# Patient Record
Sex: MALE | Race: BLACK OR AFRICAN AMERICAN | Employment: OTHER | URBAN - METROPOLITAN AREA
[De-identification: names, ages, dates, MRNs, and addresses within clinical notes are randomized per-mention and may not be internally consistent; named-entity substitution may affect disease eponyms.]

---

## 2020-01-06 ENCOUNTER — OFFICE VISIT (OUTPATIENT)
Dept: FAMILY MEDICINE CLINIC | Age: 85
End: 2020-01-06
Payer: MEDICARE

## 2020-01-06 VITALS
SYSTOLIC BLOOD PRESSURE: 116 MMHG | WEIGHT: 239.8 LBS | DIASTOLIC BLOOD PRESSURE: 68 MMHG | HEART RATE: 58 BPM | TEMPERATURE: 96.3 F

## 2020-01-06 PROCEDURE — G8427 DOCREV CUR MEDS BY ELIG CLIN: HCPCS | Performed by: INTERNAL MEDICINE

## 2020-01-06 PROCEDURE — 4040F PNEUMOC VAC/ADMIN/RCVD: CPT | Performed by: INTERNAL MEDICINE

## 2020-01-06 PROCEDURE — 4004F PT TOBACCO SCREEN RCVD TLK: CPT | Performed by: INTERNAL MEDICINE

## 2020-01-06 PROCEDURE — 99213 OFFICE O/P EST LOW 20 MIN: CPT | Performed by: INTERNAL MEDICINE

## 2020-01-06 PROCEDURE — 1123F ACP DISCUSS/DSCN MKR DOCD: CPT | Performed by: INTERNAL MEDICINE

## 2020-01-06 PROCEDURE — G8421 BMI NOT CALCULATED: HCPCS | Performed by: INTERNAL MEDICINE

## 2020-01-06 PROCEDURE — G8484 FLU IMMUNIZE NO ADMIN: HCPCS | Performed by: INTERNAL MEDICINE

## 2020-01-06 RX ORDER — TEMAZEPAM 30 MG/1
CAPSULE ORAL
COMMUNITY
End: 2020-02-18 | Stop reason: SDUPTHER

## 2020-01-06 RX ORDER — CETIRIZINE HYDROCHLORIDE 10 MG/1
TABLET ORAL
COMMUNITY
End: 2021-05-27

## 2020-01-06 RX ORDER — ZOLPIDEM TARTRATE 10 MG/1
TABLET ORAL
COMMUNITY
End: 2020-03-10 | Stop reason: SDUPTHER

## 2020-01-06 RX ORDER — LEVOTHYROXINE SODIUM 0.12 MG/1
TABLET ORAL
Status: ON HOLD | COMMUNITY
End: 2020-04-29

## 2020-01-06 RX ORDER — MONTELUKAST SODIUM 10 MG/1
TABLET ORAL
COMMUNITY
End: 2020-11-12

## 2020-01-06 RX ORDER — DICLOFENAC SODIUM 75 MG/1
TABLET, DELAYED RELEASE ORAL
Status: ON HOLD | COMMUNITY
End: 2020-05-01 | Stop reason: HOSPADM

## 2020-01-06 RX ORDER — LATANOPROST 50 UG/ML
1 SOLUTION/ DROPS OPHTHALMIC
COMMUNITY
Start: 2018-09-13 | End: 2021-05-27

## 2020-01-06 RX ORDER — LEVOTHYROXINE SODIUM 112 UG/1
TABLET ORAL
COMMUNITY
End: 2020-02-19 | Stop reason: SDUPTHER

## 2020-01-06 RX ORDER — TAMSULOSIN HYDROCHLORIDE 0.4 MG/1
CAPSULE ORAL
Status: ON HOLD | COMMUNITY
End: 2020-05-01 | Stop reason: HOSPADM

## 2020-01-06 ASSESSMENT — ENCOUNTER SYMPTOMS
TROUBLE SWALLOWING: 0
FACIAL SWELLING: 0
SINUS PRESSURE: 0
EYE REDNESS: 0
CONSTIPATION: 0
EYE PAIN: 0
WHEEZING: 0
VOMITING: 0
ABDOMINAL DISTENTION: 0
ABDOMINAL PAIN: 0
BACK PAIN: 0
NAUSEA: 0
COLOR CHANGE: 0
BLOOD IN STOOL: 0
EYE ITCHING: 0
SHORTNESS OF BREATH: 0
RHINORRHEA: 0
PHOTOPHOBIA: 0
CHEST TIGHTNESS: 0
APNEA: 0
SINUS PAIN: 0
EYE DISCHARGE: 0
SORE THROAT: 0
RECTAL PAIN: 0
COUGH: 0
DIARRHEA: 0
VOICE CHANGE: 0

## 2020-01-06 NOTE — PROGRESS NOTES
Non-medical: Not on file   Tobacco Use    Smoking status: Not on file   Substance and Sexual Activity    Alcohol use: Not on file    Drug use: Not on file    Sexual activity: Not on file   Lifestyle    Physical activity:     Days per week: Not on file     Minutes per session: Not on file    Stress: Not on file   Relationships    Social connections:     Talks on phone: Not on file     Gets together: Not on file     Attends Religion service: Not on file     Active member of club or organization: Not on file     Attends meetings of clubs or organizations: Not on file     Relationship status: Not on file    Intimate partner violence:     Fear of current or ex partner: Not on file     Emotionally abused: Not on file     Physically abused: Not on file     Forced sexual activity: Not on file   Other Topics Concern    Not on file   Social History Narrative    Not on file        History reviewed. No pertinent family history. Vitals:    01/06/20 1616   BP: 116/68   Site: Right Upper Arm   Position: Sitting   Cuff Size: Large Adult   Pulse: 58   Temp: 96.3 °F (35.7 °C)   TempSrc: Temporal   Weight: 239 lb 12.8 oz (108.8 kg)     There is no height or weight on file to calculate BMI. Physical Exam  Constitutional:       General: He is not in acute distress. Appearance: He is well-developed. HENT:      Head: Normocephalic. Right Ear: External ear normal.      Left Ear: External ear normal.   Eyes:      Conjunctiva/sclera: Conjunctivae normal.   Neck:      Musculoskeletal: Neck supple. Vascular: No JVD. Trachea: No tracheal deviation. Cardiovascular:      Rate and Rhythm: Normal rate and regular rhythm. Heart sounds: Normal heart sounds. Pulmonary:      Effort: Pulmonary effort is normal. No respiratory distress. Breath sounds: Normal breath sounds. No wheezing or rales. Chest:      Chest wall: No tenderness.    Abdominal:      General: Bowel sounds are normal. There is no distension. Palpations: Abdomen is soft. There is no mass. Tenderness: There is no tenderness. There is no guarding. Musculoskeletal:         General: No tenderness or deformity. Lymphadenopathy:      Cervical: No cervical adenopathy. Skin:     General: Skin is warm and dry. Coloration: Skin is not pale. Findings: No erythema. Neurological:      Mental Status: He is alert and oriented to person, place, and time. Motor: No abnormal muscle tone. Psychiatric:         Thought Content: Thought content normal.         Judgment: Judgment normal.         ASSESSMENT/PLAN:  1. Hypothyroidism, unspecified type  - TSH with Reflex; Future        2. Health care maintenance  - Comprehensive Metabolic Panel; Future  - CBC Auto Differential; Future  - Lipid Panel; Future  - Vitamin D 25 Hydroxy; Future        3. Benign prostatic hyperplasia without lower urinary tract symptoms-continue Flomax. No follow-ups on file. An  electronic signature was used to authenticate this note.     --Panfilo Wade MD on 1/7/2020 at 5:47 PM

## 2020-02-17 ENCOUNTER — OFFICE VISIT (OUTPATIENT)
Dept: FAMILY MEDICINE CLINIC | Age: 85
End: 2020-02-17
Payer: MEDICARE

## 2020-02-17 VITALS
HEART RATE: 64 BPM | WEIGHT: 239.6 LBS | DIASTOLIC BLOOD PRESSURE: 68 MMHG | BODY MASS INDEX: 32.45 KG/M2 | TEMPERATURE: 97.6 F | OXYGEN SATURATION: 99 % | SYSTOLIC BLOOD PRESSURE: 128 MMHG | HEIGHT: 72 IN

## 2020-02-17 DIAGNOSIS — E03.9 HYPOTHYROIDISM, UNSPECIFIED TYPE: ICD-10-CM

## 2020-02-17 DIAGNOSIS — Z00.00 HEALTH CARE MAINTENANCE: ICD-10-CM

## 2020-02-17 LAB
ALBUMIN SERPL-MCNC: 3.9 G/DL (ref 3.5–4.6)
ALP BLD-CCNC: 57 U/L (ref 35–104)
ALT SERPL-CCNC: 13 U/L (ref 0–41)
ANION GAP SERPL CALCULATED.3IONS-SCNC: 13 MEQ/L (ref 9–15)
AST SERPL-CCNC: 28 U/L (ref 0–40)
BASOPHILS ABSOLUTE: 0 K/UL (ref 0–0.2)
BASOPHILS RELATIVE PERCENT: 0.8 %
BILIRUB SERPL-MCNC: 0.3 MG/DL (ref 0.2–0.7)
BUN BLDV-MCNC: 19 MG/DL (ref 8–23)
CALCIUM SERPL-MCNC: 9.3 MG/DL (ref 8.5–9.9)
CHLORIDE BLD-SCNC: 104 MEQ/L (ref 95–107)
CHOLESTEROL, TOTAL: 217 MG/DL (ref 0–199)
CO2: 26 MEQ/L (ref 20–31)
CREAT SERPL-MCNC: 2.06 MG/DL (ref 0.7–1.2)
EOSINOPHILS ABSOLUTE: 0 K/UL (ref 0–0.7)
EOSINOPHILS RELATIVE PERCENT: 0.6 %
GFR AFRICAN AMERICAN: 36.9
GFR NON-AFRICAN AMERICAN: 30.5
GLOBULIN: 3.6 G/DL (ref 2.3–3.5)
GLUCOSE BLD-MCNC: 119 MG/DL (ref 70–99)
HCT VFR BLD CALC: 38.2 % (ref 42–52)
HDLC SERPL-MCNC: 44 MG/DL (ref 40–59)
HEMOGLOBIN: 13 G/DL (ref 14–18)
LDL CHOLESTEROL CALCULATED: 132 MG/DL (ref 0–129)
LYMPHOCYTES ABSOLUTE: 1.4 K/UL (ref 1–4.8)
LYMPHOCYTES RELATIVE PERCENT: 32 %
MCH RBC QN AUTO: 33.9 PG (ref 27–31.3)
MCHC RBC AUTO-ENTMCNC: 33.9 % (ref 33–37)
MCV RBC AUTO: 99.9 FL (ref 80–100)
MONOCYTES ABSOLUTE: 0.3 K/UL (ref 0.2–0.8)
MONOCYTES RELATIVE PERCENT: 5.9 %
NEUTROPHILS ABSOLUTE: 2.7 K/UL (ref 1.4–6.5)
NEUTROPHILS RELATIVE PERCENT: 60.7 %
PDW BLD-RTO: 15.6 % (ref 11.5–14.5)
PLATELET # BLD: 190 K/UL (ref 130–400)
POTASSIUM SERPL-SCNC: 4.2 MEQ/L (ref 3.4–4.9)
RBC # BLD: 3.82 M/UL (ref 4.7–6.1)
SODIUM BLD-SCNC: 143 MEQ/L (ref 135–144)
T4 FREE: 0.26 NG/DL (ref 0.84–1.68)
TOTAL PROTEIN: 7.5 G/DL (ref 6.3–8)
TRIGL SERPL-MCNC: 204 MG/DL (ref 0–150)
TSH REFLEX: 133.8 UIU/ML (ref 0.44–3.86)
VITAMIN D 25-HYDROXY: 22.2 NG/ML (ref 30–100)
WBC # BLD: 4.5 K/UL (ref 4.8–10.8)

## 2020-02-17 PROCEDURE — 1123F ACP DISCUSS/DSCN MKR DOCD: CPT | Performed by: INTERNAL MEDICINE

## 2020-02-17 PROCEDURE — G8417 CALC BMI ABV UP PARAM F/U: HCPCS | Performed by: INTERNAL MEDICINE

## 2020-02-17 PROCEDURE — G8484 FLU IMMUNIZE NO ADMIN: HCPCS | Performed by: INTERNAL MEDICINE

## 2020-02-17 PROCEDURE — G8427 DOCREV CUR MEDS BY ELIG CLIN: HCPCS | Performed by: INTERNAL MEDICINE

## 2020-02-17 PROCEDURE — 99213 OFFICE O/P EST LOW 20 MIN: CPT | Performed by: INTERNAL MEDICINE

## 2020-02-17 PROCEDURE — 4004F PT TOBACCO SCREEN RCVD TLK: CPT | Performed by: INTERNAL MEDICINE

## 2020-02-17 PROCEDURE — 4040F PNEUMOC VAC/ADMIN/RCVD: CPT | Performed by: INTERNAL MEDICINE

## 2020-02-17 ASSESSMENT — PATIENT HEALTH QUESTIONNAIRE - PHQ9
1. LITTLE INTEREST OR PLEASURE IN DOING THINGS: 0
SUM OF ALL RESPONSES TO PHQ9 QUESTIONS 1 & 2: 0
2. FEELING DOWN, DEPRESSED OR HOPELESS: 0
SUM OF ALL RESPONSES TO PHQ QUESTIONS 1-9: 0
SUM OF ALL RESPONSES TO PHQ QUESTIONS 1-9: 0

## 2020-02-17 ASSESSMENT — ENCOUNTER SYMPTOMS
EYE PAIN: 0
VOMITING: 0
COLOR CHANGE: 0
EYE ITCHING: 0
BLOOD IN STOOL: 0
SINUS PAIN: 0
BACK PAIN: 0
SINUS PRESSURE: 0
COUGH: 0
ABDOMINAL PAIN: 0
RECTAL PAIN: 0
ABDOMINAL DISTENTION: 0
PHOTOPHOBIA: 0
SHORTNESS OF BREATH: 0
VOICE CHANGE: 0
APNEA: 0
EYE DISCHARGE: 0
CHEST TIGHTNESS: 0
RHINORRHEA: 0
FACIAL SWELLING: 0
CONSTIPATION: 0
DIARRHEA: 0
EYE REDNESS: 0
TROUBLE SWALLOWING: 0
WHEEZING: 0
SORE THROAT: 0
NAUSEA: 0

## 2020-02-17 NOTE — TELEPHONE ENCOUNTER
Angi Amanda is requesting medication refill. Patient has confirmed the pharmacy. Rx requested:  Requested Prescriptions     Pending Prescriptions Disp Refills    temazepam (RESTORIL) 30 MG capsule       Sig: Take by mouth.        Last Office Visit:   2/17/2020      Next Visit Date:  Future Appointments   Date Time Provider Megan Jasso   2/25/2020  9:45 AM Del Medellin  [place], [place]

## 2020-02-17 NOTE — PROGRESS NOTES
2020    Sirisha Javier (:  1930) is a 80 y.o. male, here for evaluation of the following medical concerns:    HPI  66-year-old male with a history of hypothyroidism presents to establish continuity with me as his primary care doctor. He voices no complaints at this time. Hypothyroidism:  He presently receives Synthroid 125 mcg daily. The patient is due for an assessment of his TSH and free T4. Benign prostatic hypertrophy :  The patient is compliant with Flomax. At present he denies polyuria,  Polydipsia, constitutional, sinus, visual, cardiopulmonary, urologic, gastrointestinal, immunologic/hematologic, musculoskeletal, neurologic,dermatologic, or psychiatric complaints. Review of Systems   Constitutional: Negative for chills, diaphoresis, fatigue and fever. HENT: Negative for congestion, dental problem, drooling, ear discharge, ear pain, facial swelling, hearing loss, mouth sores, nosebleeds, postnasal drip, rhinorrhea, sinus pressure, sinus pain, sneezing, sore throat, tinnitus, trouble swallowing and voice change. Eyes: Negative for photophobia, pain, discharge, redness, itching and visual disturbance. Respiratory: Negative for apnea, cough, chest tightness, shortness of breath and wheezing. Cardiovascular: Negative for chest pain, palpitations and leg swelling. Gastrointestinal: Negative for abdominal distention, abdominal pain, blood in stool, constipation, diarrhea, nausea, rectal pain and vomiting. Endocrine: Negative for cold intolerance, heat intolerance, polydipsia, polyphagia and polyuria. Genitourinary: Negative for decreased urine volume, difficulty urinating, dysuria, flank pain, frequency, genital sores, hematuria and urgency. Musculoskeletal: Negative for arthralgias, back pain, gait problem, joint swelling, myalgias, neck pain and neck stiffness. Skin: Negative for color change, rash and wound.    Allergic/Immunologic: Negative for Activity    Alcohol use: Not on file    Drug use: Not on file    Sexual activity: Not on file   Lifestyle    Physical activity:     Days per week: Not on file     Minutes per session: Not on file    Stress: Not on file   Relationships    Social connections:     Talks on phone: Not on file     Gets together: Not on file     Attends Yazidism service: Not on file     Active member of club or organization: Not on file     Attends meetings of clubs or organizations: Not on file     Relationship status: Not on file    Intimate partner violence:     Fear of current or ex partner: Not on file     Emotionally abused: Not on file     Physically abused: Not on file     Forced sexual activity: Not on file   Other Topics Concern    Not on file   Social History Narrative    Not on file        No family history on file. There were no vitals filed for this visit. There is no height or weight on file to calculate BMI. Physical Exam  Constitutional:       General: He is not in acute distress. Appearance: He is well-developed. HENT:      Head: Normocephalic. Right Ear: External ear normal.      Left Ear: External ear normal.   Eyes:      Conjunctiva/sclera: Conjunctivae normal.   Neck:      Musculoskeletal: Neck supple. Vascular: No JVD. Trachea: No tracheal deviation. Cardiovascular:      Rate and Rhythm: Normal rate and regular rhythm. Heart sounds: Normal heart sounds. Pulmonary:      Effort: Pulmonary effort is normal. No respiratory distress. Breath sounds: Normal breath sounds. No wheezing or rales. Chest:      Chest wall: No tenderness. Abdominal:      General: Bowel sounds are normal. There is no distension. Palpations: Abdomen is soft. There is no mass. Tenderness: There is no abdominal tenderness. There is no guarding. Musculoskeletal:         General: No tenderness or deformity. Lymphadenopathy:      Cervical: No cervical adenopathy.    Skin:     General:

## 2020-02-18 RX ORDER — TEMAZEPAM 30 MG/1
30 CAPSULE ORAL NIGHTLY PRN
Qty: 7 CAPSULE | Refills: 0 | Status: SHIPPED | OUTPATIENT
Start: 2020-02-18 | End: 2020-02-27 | Stop reason: SDUPTHER

## 2020-02-19 RX ORDER — LEVOTHYROXINE SODIUM 112 UG/1
112 TABLET ORAL DAILY
Qty: 90 TABLET | Refills: 1 | Status: SHIPPED | OUTPATIENT
Start: 2020-02-19 | End: 2020-11-12

## 2020-02-25 ENCOUNTER — OFFICE VISIT (OUTPATIENT)
Dept: FAMILY MEDICINE CLINIC | Age: 85
End: 2020-02-25
Payer: MEDICARE

## 2020-02-25 VITALS
TEMPERATURE: 96 F | DIASTOLIC BLOOD PRESSURE: 82 MMHG | SYSTOLIC BLOOD PRESSURE: 122 MMHG | WEIGHT: 240 LBS | HEART RATE: 64 BPM | BODY MASS INDEX: 32.55 KG/M2 | OXYGEN SATURATION: 97 %

## 2020-02-25 PROCEDURE — G8427 DOCREV CUR MEDS BY ELIG CLIN: HCPCS | Performed by: INTERNAL MEDICINE

## 2020-02-25 PROCEDURE — 99213 OFFICE O/P EST LOW 20 MIN: CPT | Performed by: INTERNAL MEDICINE

## 2020-02-25 PROCEDURE — 1123F ACP DISCUSS/DSCN MKR DOCD: CPT | Performed by: INTERNAL MEDICINE

## 2020-02-25 PROCEDURE — 4040F PNEUMOC VAC/ADMIN/RCVD: CPT | Performed by: INTERNAL MEDICINE

## 2020-02-25 PROCEDURE — G8417 CALC BMI ABV UP PARAM F/U: HCPCS | Performed by: INTERNAL MEDICINE

## 2020-02-25 PROCEDURE — G8484 FLU IMMUNIZE NO ADMIN: HCPCS | Performed by: INTERNAL MEDICINE

## 2020-02-25 PROCEDURE — 1036F TOBACCO NON-USER: CPT | Performed by: INTERNAL MEDICINE

## 2020-02-25 RX ORDER — TAMSULOSIN HYDROCHLORIDE 0.4 MG/1
0.8 CAPSULE ORAL DAILY
Qty: 30 CAPSULE | Refills: 0 | COMMUNITY
Start: 2020-02-25 | End: 2020-11-12

## 2020-02-25 RX ORDER — FINASTERIDE 5 MG/1
5 TABLET, FILM COATED ORAL DAILY
Qty: 30 TABLET | Refills: 3 | Status: SHIPPED | OUTPATIENT
Start: 2020-02-25 | End: 2020-11-12

## 2020-02-25 ASSESSMENT — ENCOUNTER SYMPTOMS
BACK PAIN: 0
COLOR CHANGE: 0
EYE REDNESS: 0
EYE ITCHING: 0
WHEEZING: 0
TROUBLE SWALLOWING: 0
RECTAL PAIN: 0
EYE DISCHARGE: 0
DIARRHEA: 0
SORE THROAT: 0
VOMITING: 0
BLOOD IN STOOL: 0
EYE PAIN: 0
COUGH: 0
APNEA: 0
RHINORRHEA: 0
SINUS PAIN: 0
NAUSEA: 0
ABDOMINAL PAIN: 0
VOICE CHANGE: 0
SINUS PRESSURE: 0
CONSTIPATION: 0
SHORTNESS OF BREATH: 0
ABDOMINAL DISTENTION: 0
PHOTOPHOBIA: 0
FACIAL SWELLING: 0
CHEST TIGHTNESS: 0

## 2020-02-25 NOTE — PROGRESS NOTES
abdominal distention, abdominal pain, blood in stool, constipation, diarrhea, nausea, rectal pain and vomiting. Endocrine: Negative for cold intolerance, heat intolerance, polydipsia, polyphagia and polyuria. Genitourinary: Negative for decreased urine volume, difficulty urinating, dysuria, flank pain, frequency, genital sores, hematuria and urgency. Musculoskeletal: Negative for arthralgias, back pain, gait problem, joint swelling, myalgias, neck pain and neck stiffness. Skin: Negative for color change, rash and wound. Allergic/Immunologic: Negative for environmental allergies and food allergies. Neurological: Positive for dizziness. Negative for tremors, seizures, syncope, facial asymmetry, speech difficulty, weakness, light-headedness, numbness and headaches. Hematological: Negative for adenopathy. Does not bruise/bleed easily. Psychiatric/Behavioral: Negative for agitation, confusion, decreased concentration, hallucinations, self-injury, sleep disturbance and suicidal ideas. The patient is not nervous/anxious. Prior to Visit Medications    Medication Sig Taking? Authorizing Provider   finasteride (PROSCAR) 5 MG tablet Take 1 tablet by mouth daily Yes Claire Cardozo MD   tamsulosin [facility]) 0.4 MG capsule Take 2 capsules by mouth daily Yes Claire Cardozo MD   levothyroxine (SYNTHROID) 112 MCG tablet Take 1 tablet by mouth Daily Yes Claire Cardozo MD   temazepam (RESTORIL) 30 MG capsule Take 1 capsule by mouth nightly as needed for Sleep for up to 7 days.  Yes Claire Cardozo MD   ASPIRIN 81 PO Take by mouth Yes Historical Provider, MD   levothyroxine (SYNTHROID) 125 MCG tablet  Yes Historical Provider, MD   tamsulosin (FLOMAX) 0.4 MG capsule Take by mouth Yes Historical Provider, MD   zolpidem (AMBIEN) 10 MG tablet  Yes Historical Provider, MD   montelukast (SINGULAIR) 10 MG tablet Take by mouth Yes Historical Provider, MD   diclofenac (VOLTAREN) 75 MG EC tablet  Yes Historical mass index is 32.55 kg/m² as calculated from the following:    Height as of 2/17/20: 6' (1.829 m). Weight as of this encounter: 240 lb (108.9 kg). Physical Exam  Constitutional:       General: He is not in acute distress. Appearance: He is well-developed. HENT:      Head: Normocephalic. Right Ear: External ear normal.      Left Ear: External ear normal.   Eyes:      Conjunctiva/sclera: Conjunctivae normal.   Neck:      Musculoskeletal: Neck supple. Vascular: No JVD. Trachea: No tracheal deviation. Cardiovascular:      Rate and Rhythm: Normal rate and regular rhythm. Heart sounds: Normal heart sounds. Pulmonary:      Effort: Pulmonary effort is normal. No respiratory distress. Breath sounds: Normal breath sounds. No wheezing or rales. Chest:      Chest wall: No tenderness. Abdominal:      General: Bowel sounds are normal. There is no distension. Palpations: Abdomen is soft. There is no mass. Tenderness: There is no abdominal tenderness. There is no guarding. Musculoskeletal:         General: No tenderness or deformity. Lymphadenopathy:      Cervical: No cervical adenopathy. Skin:     General: Skin is warm and dry. Coloration: Skin is not pale. Findings: No erythema. Neurological:      Mental Status: He is alert and oriented to person, place, and time. Motor: No abnormal muscle tone. Psychiatric:         Thought Content: Thought content normal.         Judgment: Judgment normal.         ASSESSMENT/PLAN:  1. Hypothyroidism, unspecified type  -Encouraged compliance with Synthroid SS thyroid level in 3 months. 2. Benign prostatic hyperplasia without lower urinary tract symptoms- increase Flomax 2.8 daily. Add finasteride. Return in about 2 weeks (around 3/10/2020). An  electronic signature was used to authenticate this note.     --Carl Alford MD on 2/25/2020 at 8:50 PM

## 2020-02-29 RX ORDER — TEMAZEPAM 30 MG/1
30 CAPSULE ORAL NIGHTLY PRN
Qty: 7 CAPSULE | Refills: 0 | Status: SHIPPED | OUTPATIENT
Start: 2020-02-29 | End: 2020-03-07

## 2020-03-02 ENCOUNTER — TELEPHONE (OUTPATIENT)
Dept: FAMILY MEDICINE CLINIC | Age: 85
End: 2020-03-02

## 2020-03-02 NOTE — TELEPHONE ENCOUNTER
Pt wife called because pt's Rx is showing printed in the system, but is not up front to be picked up. Is this Rx ready to be picked up? Please call pt/wife to advise.     TY

## 2020-03-10 ENCOUNTER — OFFICE VISIT (OUTPATIENT)
Dept: FAMILY MEDICINE CLINIC | Age: 85
End: 2020-03-10
Payer: MEDICARE

## 2020-03-10 VITALS
DIASTOLIC BLOOD PRESSURE: 72 MMHG | WEIGHT: 237.4 LBS | OXYGEN SATURATION: 96 % | TEMPERATURE: 96.3 F | HEART RATE: 50 BPM | SYSTOLIC BLOOD PRESSURE: 112 MMHG | BODY MASS INDEX: 32.2 KG/M2

## 2020-03-10 PROCEDURE — G8417 CALC BMI ABV UP PARAM F/U: HCPCS | Performed by: INTERNAL MEDICINE

## 2020-03-10 PROCEDURE — G8484 FLU IMMUNIZE NO ADMIN: HCPCS | Performed by: INTERNAL MEDICINE

## 2020-03-10 PROCEDURE — 1036F TOBACCO NON-USER: CPT | Performed by: INTERNAL MEDICINE

## 2020-03-10 PROCEDURE — 4040F PNEUMOC VAC/ADMIN/RCVD: CPT | Performed by: INTERNAL MEDICINE

## 2020-03-10 PROCEDURE — G8427 DOCREV CUR MEDS BY ELIG CLIN: HCPCS | Performed by: INTERNAL MEDICINE

## 2020-03-10 PROCEDURE — 1123F ACP DISCUSS/DSCN MKR DOCD: CPT | Performed by: INTERNAL MEDICINE

## 2020-03-10 PROCEDURE — 99213 OFFICE O/P EST LOW 20 MIN: CPT | Performed by: INTERNAL MEDICINE

## 2020-03-10 RX ORDER — ZOLPIDEM TARTRATE 10 MG/1
10 TABLET ORAL NIGHTLY PRN
Qty: 7 TABLET | Refills: 0 | Status: SHIPPED | OUTPATIENT
Start: 2020-03-10 | End: 2020-03-17

## 2020-03-10 ASSESSMENT — ENCOUNTER SYMPTOMS
SORE THROAT: 0
DIARRHEA: 0
VOICE CHANGE: 0
CONSTIPATION: 0
TROUBLE SWALLOWING: 0
EYE REDNESS: 0
EYE ITCHING: 0
ABDOMINAL DISTENTION: 0
EYE PAIN: 0
APNEA: 0
FACIAL SWELLING: 0
NAUSEA: 0
PHOTOPHOBIA: 0
SINUS PRESSURE: 0
VOMITING: 0
RHINORRHEA: 0
WHEEZING: 0
EYE DISCHARGE: 0
SINUS PAIN: 0
ABDOMINAL PAIN: 0
RECTAL PAIN: 0
CHEST TIGHTNESS: 0
BACK PAIN: 0
COLOR CHANGE: 0
COUGH: 0
SHORTNESS OF BREATH: 0
BLOOD IN STOOL: 0

## 2020-03-10 NOTE — PROGRESS NOTES
3/10/2020    Miriam Masters (:  1930) is a 80 y.o. male, here for evaluation of the following medical concerns:    Dizziness   Pertinent negatives include no abdominal pain, arthralgias, chest pain, chills, congestion, coughing, diaphoresis, fatigue, fever, headaches, joint swelling, myalgias, nausea, neck pain, numbness, rash, sore throat, vomiting or weakness. 80-year-old male with a history of hypothyroidism presents  for follow-up visit for his hypothyroidism. Hypothyroidism:  He presently receives Synthroid 125 mcg daily. The patient's TSH was markedly elevated on  and his thyroxine level low as is demonstrated below:    Component      Latest Ref Rng & Units 2020           4:34 PM  4:34 PM   TSH      0.440 - 3.860 uIU/mL  133.800 (H)   T4 Free      0.84 - 1.68 ng/dL 0.26 (L)    His abnormal labs are in the setting of increased fatigue. Benign prostatic hypertrophy :  The patient is compliant with Flomax and Proscar as prescribed. His symptoms of polyuria have improved however he is experiencing intermittent lightheadedness upon standing . At present he denies,  Polydipsia, constitutional, sinus, visual, cardiopulmonary, urologic, gastrointestinal, immunologic/hematologic, musculoskeletal, neurologic,dermatologic, or psychiatric complaints. Review of Systems   Constitutional: Negative for chills, diaphoresis, fatigue and fever. HENT: Negative for congestion, dental problem, drooling, ear discharge, ear pain, facial swelling, hearing loss, mouth sores, nosebleeds, postnasal drip, rhinorrhea, sinus pressure, sinus pain, sneezing, sore throat, tinnitus, trouble swallowing and voice change. Eyes: Negative for photophobia, pain, discharge, redness, itching and visual disturbance. Respiratory: Negative for apnea, cough, chest tightness, shortness of breath and wheezing. Cardiovascular: Negative for chest pain, palpitations and leg swelling. Gastrointestinal: Negative for abdominal distention, abdominal pain, blood in stool, constipation, diarrhea, nausea, rectal pain and vomiting. Endocrine: Negative for cold intolerance, heat intolerance, polydipsia, polyphagia and polyuria. Genitourinary: Negative for decreased urine volume, difficulty urinating, dysuria, flank pain, frequency, genital sores, hematuria and urgency. Musculoskeletal: Negative for arthralgias, back pain, gait problem, joint swelling, myalgias, neck pain and neck stiffness. Skin: Negative for color change, rash and wound. Allergic/Immunologic: Negative for environmental allergies and food allergies. Neurological: Positive for dizziness. Negative for tremors, seizures, syncope, facial asymmetry, speech difficulty, weakness, light-headedness, numbness and headaches. Hematological: Negative for adenopathy. Does not bruise/bleed easily. Psychiatric/Behavioral: Negative for agitation, confusion, decreased concentration, hallucinations, self-injury, sleep disturbance and suicidal ideas. The patient is not nervous/anxious. Prior to Visit Medications    Medication Sig Taking?  Authorizing Provider   finasteride (PROSCAR) 5 MG tablet Take 1 tablet by mouth daily Yes Nuria Cazares MD   tamsulosin (FLOMAX) 0.4 MG capsule Take 2 capsules by mouth daily Yes Nuria Cazares MD   levothyroxine (SYNTHROID) 112 MCG tablet Take 1 tablet by mouth Daily Yes Nuria Cazares MD   ASPIRIN 81 PO Take by mouth Yes Historical Provider, MD   levothyroxine (SYNTHROID) 125 MCG tablet  Yes Historical Provider, MD   tamsulosin (FLOMAX) 0.4 MG capsule Take by mouth Yes Historical Provider, MD   zolpidem (AMBIEN) 10 MG tablet  Yes Historical Provider, MD   montelukast (SINGULAIR) 10 MG tablet Take by mouth Yes Historical Provider, MD   diclofenac (VOLTAREN) 75 MG EC tablet  Yes Historical Provider, MD   latanoprost (XALATAN) 0.005 % ophthalmic solution 1 drop Yes Historical Provider, MD m).    Weight as of this encounter: 237 lb 6.4 oz (107.7 kg). Physical Exam  Constitutional:       General: He is not in acute distress. Appearance: He is well-developed. HENT:      Head: Normocephalic. Right Ear: External ear normal.      Left Ear: External ear normal.   Eyes:      Conjunctiva/sclera: Conjunctivae normal.   Neck:      Musculoskeletal: Neck supple. Vascular: No JVD. Trachea: No tracheal deviation. Cardiovascular:      Rate and Rhythm: Normal rate and regular rhythm. Heart sounds: Normal heart sounds. Pulmonary:      Effort: Pulmonary effort is normal. No respiratory distress. Breath sounds: Normal breath sounds. No wheezing or rales. Chest:      Chest wall: No tenderness. Abdominal:      General: Bowel sounds are normal. There is no distension. Palpations: Abdomen is soft. There is no mass. Tenderness: There is no abdominal tenderness. There is no guarding. Musculoskeletal:         General: No tenderness or deformity. Lymphadenopathy:      Cervical: No cervical adenopathy. Skin:     General: Skin is warm and dry. Coloration: Skin is not pale. Findings: No erythema. Neurological:      Mental Status: He is alert and oriented to person, place, and time. Motor: No abnormal muscle tone. Psychiatric:         Thought Content: Thought content normal.         Judgment: Judgment normal.         ASSESSMENT/PLAN:  Hypothyroidism, unspecified type  -Encouraged compliance with Synthroid SS thyroid level in 3 months. Benign prostatic hyperplasia without lower urinary tract symptoms- discontinue Flomax . 4 mg daily. Continue finasteride. Insomnia- 1 week supply of Ambien. Oars report reviewed. I have conveyed to the patient that this medication will not be prescribed chronically to treat his insomnia as other alternatives would likely be equally effective. No follow-ups on file.     An  electronic signature was used to authenticate this note.     --Minnie Solano MD on 3/10/2020 at 9:46 AM

## 2020-03-24 ENCOUNTER — VIRTUAL VISIT (OUTPATIENT)
Dept: FAMILY MEDICINE CLINIC | Age: 85
End: 2020-03-24
Payer: MEDICARE

## 2020-03-24 PROCEDURE — 99441 PR PHYS/QHP TELEPHONE EVALUATION 5-10 MIN: CPT | Performed by: INTERNAL MEDICINE

## 2020-03-24 ASSESSMENT — ENCOUNTER SYMPTOMS
RHINORRHEA: 0
WHEEZING: 0
FACIAL SWELLING: 0
VOMITING: 0
BACK PAIN: 0
VOICE CHANGE: 0
APNEA: 0
EYE PAIN: 0
DIARRHEA: 0
TROUBLE SWALLOWING: 0
ABDOMINAL DISTENTION: 0
PHOTOPHOBIA: 0
EYE DISCHARGE: 0
RECTAL PAIN: 0
COLOR CHANGE: 0
SHORTNESS OF BREATH: 0
COUGH: 0
SORE THROAT: 0
CHEST TIGHTNESS: 0
ABDOMINAL PAIN: 0
BLOOD IN STOOL: 0
SINUS PAIN: 0
EYE REDNESS: 0
NAUSEA: 0
SINUS PRESSURE: 0
CONSTIPATION: 0
EYE ITCHING: 0

## 2020-03-24 NOTE — PROGRESS NOTES
3/24/2020    Ramana Almaraz (:  1930) is a 80 y.o. male, here for evaluation of the following medical concerns:          49-year-old male with a history of hypothyroidism presents  for follow-up visit for his hypothyroidism. Hypothyroidism:  He  Is presently compliant with  Synthroid 125 mcg daily. The patients thyroid studies are noted below. Component      Latest Ref Rng & Units 2020           4:34 PM  4:34 PM   TSH      0.440 - 3.860 uIU/mL  133.800 (H)   T4 Free      0.84 - 1.68 ng/dL 0.26 (L)    . Benign prostatic hypertrophy :  The patient is compliant with  Proscar as prescribed. His symptoms of polyuria have improved. His   intermittent lightheadedness is improving     Insomnia: The patient is experiencing insomnia which is controlled with Ambien which he is utilizing sparingly. At present he denies,  Polydipsia, constitutional, sinus, visual, cardiopulmonary, urologic, gastrointestinal, immunologic/hematologic, musculoskeletal, neurologic,dermatologic, or psychiatric complaints. Review of Systems   Constitutional: Negative for chills, diaphoresis, fatigue and fever. HENT: Negative for congestion, dental problem, drooling, ear discharge, ear pain, facial swelling, hearing loss, mouth sores, nosebleeds, postnasal drip, rhinorrhea, sinus pressure, sinus pain, sneezing, sore throat, tinnitus, trouble swallowing and voice change. Eyes: Negative for photophobia, pain, discharge, redness, itching and visual disturbance. Respiratory: Negative for apnea, cough, chest tightness, shortness of breath and wheezing. Cardiovascular: Negative for chest pain, palpitations and leg swelling. Gastrointestinal: Negative for abdominal distention, abdominal pain, blood in stool, constipation, diarrhea, nausea, rectal pain and vomiting. Endocrine: Negative for cold intolerance, heat intolerance, polydipsia, polyphagia and polyuria.    Genitourinary: Negative for decreased urine volume, difficulty urinating, dysuria, flank pain, frequency, genital sores, hematuria and urgency. Musculoskeletal: Negative for arthralgias, back pain, gait problem, joint swelling, myalgias, neck pain and neck stiffness. Skin: Negative for color change, rash and wound. Allergic/Immunologic: Negative for environmental allergies and food allergies. Neurological: Positive for dizziness. Negative for tremors, seizures, syncope, facial asymmetry, speech difficulty, weakness, light-headedness, numbness and headaches. Hematological: Negative for adenopathy. Does not bruise/bleed easily. Psychiatric/Behavioral: Negative for agitation, confusion, decreased concentration, hallucinations, self-injury, sleep disturbance and suicidal ideas. The patient is not nervous/anxious. Prior to Visit Medications    Medication Sig Taking? Authorizing Provider   finasteride (PROSCAR) 5 MG tablet Take 1 tablet by mouth daily  Rajendra Hand MD   tamsulosin [facility]) 0.4 MG capsule Take 2 capsules by mouth daily  Rajendra Hand MD   levothyroxine (SYNTHROID) 112 MCG tablet Take 1 tablet by mouth Daily  Rajendra Hand MD   ASPIRIN 81 PO Take by mouth  Historical Provider, MD   levothyroxine (SYNTHROID) 125 MCG tablet   Historical Provider, MD   tamsulosin (FLOMAX) 0.4 MG capsule Take by mouth  Historical Provider, MD   montelukast (SINGULAIR) 10 MG tablet Take by mouth  Historical Provider, MD   diclofenac (VOLTAREN) 75 MG EC tablet   Historical Provider, MD   latanoprost (XALATAN) 0.005 % ophthalmic solution 1 drop  Historical Provider, MD   cetirizine (RA CETIRIZINE) 10 MG tablet Take by mouth  Historical Provider, MD        Allergies   Allergen Reactions    Baclofen     Cyclobenzaprine        No past medical history on file. No past surgical history on file.     Social History     Socioeconomic History    Marital status:      Spouse name: Not on file    Number of children: Not on file  Years of education: Not on file    Highest education level: Not on file   Occupational History    Not on file   Social Needs    Financial resource strain: Not on file    Food insecurity     Worry: Not on file     Inability: Not on file    Transportation needs     Medical: Not on file     Non-medical: Not on file   Tobacco Use    Smoking status: Never Smoker    Smokeless tobacco: Never Used   Substance and Sexual Activity    Alcohol use: Not on file    Drug use: Not on file    Sexual activity: Not on file   Lifestyle    Physical activity     Days per week: Not on file     Minutes per session: Not on file    Stress: Not on file   Relationships    Social connections     Talks on phone: Not on file     Gets together: Not on file     Attends Restorationist service: Not on file     Active member of club or organization: Not on file     Attends meetings of clubs or organizations: Not on file     Relationship status: Not on file    Intimate partner violence     Fear of current or ex partner: Not on file     Emotionally abused: Not on file     Physically abused: Not on file     Forced sexual activity: Not on file   Other Topics Concern    Not on file   Social History Narrative    Not on file        No family history on file. There were no vitals filed for this visit. Estimated body mass index is 32.2 kg/m² as calculated from the following:    Height as of 2/17/20: 6' (1.829 m). Weight as of 3/10/20: 237 lb 6.4 oz (107.7 kg). ASSESSMENT/PLAN:  Hypothyroidism, unspecified type  -Encouraged compliance with Synthroid. Repeat thyroid level in 3 months. Benign prostatic hyperplasia without lower urinary tract symptoms-Continue finasteride. Insomnia- 1 week supply of Ambien. Oars report reviewed. I have conveyed to the patient that this medication will not be prescribed chronically to treat his insomnia as other alternatives would likely be equally effective. Rajan Clement is a 80 y.o.

## 2020-04-29 ENCOUNTER — HOSPITAL ENCOUNTER (INPATIENT)
Age: 85
LOS: 3 days | Discharge: HOME | DRG: 378 | End: 2020-05-02
Attending: STUDENT IN AN ORGANIZED HEALTH CARE EDUCATION/TRAINING PROGRAM | Admitting: INTERNAL MEDICINE
Payer: MEDICARE

## 2020-04-29 PROBLEM — K92.2 GI BLEED: Status: ACTIVE | Noted: 2020-04-29

## 2020-04-29 LAB
ALBUMIN SERPL-MCNC: 3.5 G/DL (ref 3.5–4.6)
ALP BLD-CCNC: 52 U/L (ref 35–104)
ALT SERPL-CCNC: <5 U/L (ref 0–41)
ANION GAP SERPL CALCULATED.3IONS-SCNC: 13 MEQ/L (ref 9–15)
APTT: 27.5 SEC (ref 24.4–36.8)
AST SERPL-CCNC: 15 U/L (ref 0–40)
BASOPHILS ABSOLUTE: 0 K/UL (ref 0–0.2)
BASOPHILS RELATIVE PERCENT: 0.4 %
BILIRUB SERPL-MCNC: 0.3 MG/DL (ref 0.2–0.7)
BUN BLDV-MCNC: 20 MG/DL (ref 8–23)
CALCIUM SERPL-MCNC: 8.8 MG/DL (ref 8.5–9.9)
CHLORIDE BLD-SCNC: 103 MEQ/L (ref 95–107)
CO2: 24 MEQ/L (ref 20–31)
CREAT SERPL-MCNC: 1.74 MG/DL (ref 0.7–1.2)
EOSINOPHILS ABSOLUTE: 0 K/UL (ref 0–0.7)
EOSINOPHILS RELATIVE PERCENT: 0.2 %
GFR AFRICAN AMERICAN: 44.8
GFR NON-AFRICAN AMERICAN: 37
GLOBULIN: 3.5 G/DL (ref 2.3–3.5)
GLUCOSE BLD-MCNC: 122 MG/DL (ref 70–99)
HCT VFR BLD CALC: 36.3 % (ref 42–52)
HEMOGLOBIN: 11.9 G/DL (ref 14–18)
INR BLD: 1
LACTIC ACID: 1.1 MMOL/L (ref 0.5–2.2)
LYMPHOCYTES ABSOLUTE: 1.1 K/UL (ref 1–4.8)
LYMPHOCYTES RELATIVE PERCENT: 12.6 %
MCH RBC QN AUTO: 32.9 PG (ref 27–31.3)
MCHC RBC AUTO-ENTMCNC: 32.8 % (ref 33–37)
MCV RBC AUTO: 100.1 FL (ref 80–100)
MONOCYTES ABSOLUTE: 0.5 K/UL (ref 0.2–0.8)
MONOCYTES RELATIVE PERCENT: 6.1 %
NEUTROPHILS ABSOLUTE: 6.9 K/UL (ref 1.4–6.5)
NEUTROPHILS RELATIVE PERCENT: 80.7 %
PDW BLD-RTO: 15.2 % (ref 11.5–14.5)
PLATELET # BLD: 180 K/UL (ref 130–400)
POTASSIUM SERPL-SCNC: 4.2 MEQ/L (ref 3.4–4.9)
PROTHROMBIN TIME: 14.1 SEC (ref 12.3–14.9)
RBC # BLD: 3.62 M/UL (ref 4.7–6.1)
SODIUM BLD-SCNC: 140 MEQ/L (ref 135–144)
TOTAL PROTEIN: 7 G/DL (ref 6.3–8)
WBC # BLD: 8.6 K/UL (ref 4.8–10.8)

## 2020-04-29 PROCEDURE — 99284 EMERGENCY DEPT VISIT MOD MDM: CPT

## 2020-04-29 PROCEDURE — 36415 COLL VENOUS BLD VENIPUNCTURE: CPT

## 2020-04-29 PROCEDURE — 2580000003 HC RX 258: Performed by: INTERNAL MEDICINE

## 2020-04-29 PROCEDURE — 80053 COMPREHEN METABOLIC PANEL: CPT

## 2020-04-29 PROCEDURE — 85730 THROMBOPLASTIN TIME PARTIAL: CPT

## 2020-04-29 PROCEDURE — 83605 ASSAY OF LACTIC ACID: CPT

## 2020-04-29 PROCEDURE — 85610 PROTHROMBIN TIME: CPT

## 2020-04-29 PROCEDURE — 85025 COMPLETE CBC W/AUTO DIFF WBC: CPT

## 2020-04-29 PROCEDURE — 2060000000 HC ICU INTERMEDIATE R&B

## 2020-04-29 RX ORDER — SODIUM CHLORIDE 0.9 % (FLUSH) 0.9 %
10 SYRINGE (ML) INJECTION EVERY 12 HOURS SCHEDULED
Status: DISCONTINUED | OUTPATIENT
Start: 2020-04-29 | End: 2020-05-02 | Stop reason: HOSPADM

## 2020-04-29 RX ORDER — ACETAMINOPHEN 650 MG/1
650 SUPPOSITORY RECTAL EVERY 6 HOURS PRN
Status: DISCONTINUED | OUTPATIENT
Start: 2020-04-29 | End: 2020-05-02 | Stop reason: HOSPADM

## 2020-04-29 RX ORDER — TAMSULOSIN HYDROCHLORIDE 0.4 MG/1
0.8 CAPSULE ORAL DAILY
Status: DISCONTINUED | OUTPATIENT
Start: 2020-04-29 | End: 2020-05-02 | Stop reason: HOSPADM

## 2020-04-29 RX ORDER — PROMETHAZINE HYDROCHLORIDE 12.5 MG/1
12.5 TABLET ORAL EVERY 6 HOURS PRN
Status: DISCONTINUED | OUTPATIENT
Start: 2020-04-29 | End: 2020-05-02 | Stop reason: HOSPADM

## 2020-04-29 RX ORDER — HYDROCORTISONE 25 MG/G
CREAM TOPICAL
Qty: 1 TUBE | Refills: 0 | Status: SHIPPED | OUTPATIENT
Start: 2020-04-29 | End: 2020-05-09

## 2020-04-29 RX ORDER — ONDANSETRON 2 MG/ML
4 INJECTION INTRAMUSCULAR; INTRAVENOUS EVERY 6 HOURS PRN
Status: DISCONTINUED | OUTPATIENT
Start: 2020-04-29 | End: 2020-05-02 | Stop reason: HOSPADM

## 2020-04-29 RX ORDER — ACETAMINOPHEN 325 MG/1
650 TABLET ORAL EVERY 6 HOURS PRN
Status: DISCONTINUED | OUTPATIENT
Start: 2020-04-29 | End: 2020-05-02 | Stop reason: HOSPADM

## 2020-04-29 RX ORDER — POLYETHYLENE GLYCOL 3350 17 G/17G
17 POWDER, FOR SOLUTION ORAL DAILY PRN
Status: DISCONTINUED | OUTPATIENT
Start: 2020-04-29 | End: 2020-05-02 | Stop reason: HOSPADM

## 2020-04-29 RX ORDER — LEVOTHYROXINE SODIUM 112 UG/1
112 TABLET ORAL DAILY
Status: DISCONTINUED | OUTPATIENT
Start: 2020-04-29 | End: 2020-05-02 | Stop reason: HOSPADM

## 2020-04-29 RX ORDER — SODIUM CHLORIDE 0.9 % (FLUSH) 0.9 %
10 SYRINGE (ML) INJECTION PRN
Status: DISCONTINUED | OUTPATIENT
Start: 2020-04-29 | End: 2020-05-02 | Stop reason: HOSPADM

## 2020-04-29 RX ADMIN — Medication 10 ML: at 20:48

## 2020-04-29 SDOH — HEALTH STABILITY: MENTAL HEALTH: HOW OFTEN DO YOU HAVE A DRINK CONTAINING ALCOHOL?: NEVER

## 2020-04-29 ASSESSMENT — ENCOUNTER SYMPTOMS
ANAL BLEEDING: 1
SHORTNESS OF BREATH: 0
NAUSEA: 0
DIARRHEA: 0
CHEST TIGHTNESS: 0
TROUBLE SWALLOWING: 0
COUGH: 0
BACK PAIN: 0
ABDOMINAL PAIN: 0
SINUS PRESSURE: 0
RECTAL PAIN: 0
VOMITING: 0

## 2020-04-29 NOTE — ED NOTES
Rectal exam done by Dr Moisés Casanova at bedside which was red watery colored and positive for blood. Irene Boucher RN  04/29/20 Tara Kline 96.  Petra Boucher RN  04/29/20 [id number]

## 2020-04-29 NOTE — ED PROVIDER NOTES
Positive for light-headedness. Negative for syncope, weakness and headaches. Hematological: Does not bruise/bleed easily. All other systems reviewed and are negative. Except as noted above the remainder of the review of systems was reviewed and negative. PAST MEDICAL HISTORY   History reviewed. No pertinent past medical history. SURGICALHISTORY       Past Surgical History:   Procedure Laterality Date    HIP SURGERY      THYROIDECTOMY           CURRENT MEDICATIONS       Previous Medications    ASPIRIN 81 PO    Take by mouth    CETIRIZINE (RA CETIRIZINE) 10 MG TABLET    Take by mouth    DICLOFENAC (VOLTAREN) 75 MG EC TABLET        FINASTERIDE (PROSCAR) 5 MG TABLET    Take 1 tablet by mouth daily    LATANOPROST (XALATAN) 0.005 % OPHTHALMIC SOLUTION    1 drop    LEVOTHYROXINE (SYNTHROID) 112 MCG TABLET    Take 1 tablet by mouth Daily    LEVOTHYROXINE (SYNTHROID) 125 MCG TABLET        MONTELUKAST (SINGULAIR) 10 MG TABLET    Take by mouth    TAMSULOSIN (FLOMAX) 0.4 MG CAPSULE    Take by mouth    TAMSULOSIN (FLOMAX) 0.4 MG CAPSULE    Take 2 capsules by mouth daily       ALLERGIES     Baclofen and Cyclobenzaprine    FAMILY HISTORY     History reviewed. No pertinent family history.        SOCIAL HISTORY       Social History     Socioeconomic History    Marital status:      Spouse name: None    Number of children: None    Years of education: None    Highest education level: None   Occupational History    None   Social Needs    Financial resource strain: None    Food insecurity     Worry: None     Inability: None    Transportation needs     Medical: None     Non-medical: None   Tobacco Use    Smoking status: Never Smoker    Smokeless tobacco: Never Used   Substance and Sexual Activity    Alcohol use: Never     Frequency: Never    Drug use: None    Sexual activity: None   Lifestyle    Physical activity     Days per week: None     Minutes per session: None    Stress: None Relationships    Social connections     Talks on phone: None     Gets together: None     Attends Scientologist service: None     Active member of club or organization: None     Attends meetings of clubs or organizations: None     Relationship status: None    Intimate partner violence     Fear of current or ex partner: None     Emotionally abused: None     Physically abused: None     Forced sexual activity: None   Other Topics Concern    None   Social History Narrative    None       SCREENINGS      @FLOW([id number])@      PHYSICAL EXAM    (up to 7 for level 4, 8 or more for level 5)     ED Triage Vitals [04/29/20 1410]   BP Temp Temp Source Pulse Resp SpO2 Height Weight   117/66 98.4 °F (36.9 °C) Oral 64 18 96 % 6' (1.829 m) 249 lb (112.9 kg)       Physical Exam  Vitals signs and nursing note reviewed. Exam conducted with a chaperone present. Constitutional:       General: He is awake. He is not in acute distress. Appearance: Normal appearance. He is well-developed and normal weight. He is not ill-appearing, toxic-appearing or diaphoretic. Comments: No photophobia. No phonophobia. HENT:      Head: Normocephalic and atraumatic. No Rodriguez's sign. Right Ear: External ear normal.      Left Ear: External ear normal.      Nose: Nose normal. No congestion or rhinorrhea. Mouth/Throat:      Mouth: Mucous membranes are moist.      Pharynx: Oropharynx is clear. No oropharyngeal exudate or posterior oropharyngeal erythema. Eyes:      General: No scleral icterus. Right eye: No foreign body or discharge. Left eye: No discharge. Extraocular Movements: Extraocular movements intact. Conjunctiva/sclera: Conjunctivae normal.      Left eye: No exudate. Pupils: Pupils are equal, round, and reactive to light. Neck:      Musculoskeletal: Normal range of motion and neck supple. No neck rigidity. Vascular: No JVD. Trachea: No tracheal deviation.       Comments: No absence of a cardiologist.        RADIOLOGY:   James Crawford such as CT, Ultrasound and MRI are read by the radiologist. Plain radiographic images are visualized and preliminarily interpreted by the emergency physician with the below findings:        Interpretation per the Radiologist below, if available at the time ofthis note:    No orders to display         ED BEDSIDE ULTRASOUND:   Performed by ED Physician - none    LABS:  Labs Reviewed   CBC WITH AUTO DIFFERENTIAL - Abnormal; Notable for the following components:       Result Value    RBC 3.62 (*)     Hemoglobin 11.9 (*)     Hematocrit 36.3 (*)     .1 (*)     MCH 32.9 (*)     MCHC 32.8 (*)     RDW 15.2 (*)     Neutrophils Absolute 6.9 (*)     All other components within normal limits   COMPREHENSIVE METABOLIC PANEL - Abnormal; Notable for the following components:    Glucose 122 (*)     CREATININE 1.74 (*)     GFR Non- 37.0 (*)     GFR  44.8 (*)     All other components within normal limits   PROTIME-INR   APTT   LACTIC ACID, PLASMA       All other labs were within normal range or not returned as of this dictation. EMERGENCY DEPARTMENT COURSE and DIFFERENTIAL DIAGNOSIS/MDM:   Vitals:    Vitals:    04/29/20 1410 04/29/20 1700   BP: 117/66 (!) 149/63   Pulse: 64 65   Resp: 18 18   Temp: 98.4 °F (36.9 °C)    TempSrc: Oral    SpO2: 96% 99%   Weight: 249 lb (112.9 kg)    Height: 6' (1.829 m)            MDM   Gastroenterology was contacted Dr. Nadia Paz came down saw and evaluated the patient in the emergency room and examined him and the patient has copious probably 2 to 3 ounces of bright red rectal blood present. Recommendation now is for hospitalization with observation of bleeding status. Dr. Nadia Paz plans on patient getting prep and possible scope tomorrow. Serial blood counts. Dr. Anjelica Schrader spoke with Dr. Marlon Gordon who accepted the patient.          CONSULTS:  IP CONSULT TO HOSPITALIST  IP CONSULT TO GI    PROCEDURES:  Unless otherwise noted below, none     Procedures    FINAL IMPRESSION      1. Hemorrhage of rectum and anus    2. External hemorrhoid, bleeding    3. Renal insufficiency          DISPOSITION/PLAN   DISPOSITION Decision To Admit 04/29/2020 04:44:46 PM      PATIENT REFERRED TO:  Pam Jain MD  [id number] [phone]    Call in 1 day      [facility], [address] [phone]    Call in 1 day        DISCHARGE MEDICATIONS:  New Prescriptions    HYDROCORTISONE (ANUSOL-HC) 2.5 % CREA RECTAL CREAM    Please apply applicator tip. 1 application small amount 3 times a day.           (Please note that portions of this note were completed with a voice recognition program.  Efforts were made to edit the dictations but occasionally words are mis-transcribed.)    DO Chavez (electronically signed)  Attending Emergency Physician          DO Chavez  04/29/20 9805

## 2020-04-29 NOTE — H&P
hemoglobin/hematocrit  2. bpH: Reorder home medications  3. Hypothyroid:  4. DVT ppx: No chemical prophylaxis given possibility of active bleeding  5. Disposition: Dependent on hospital course. Will discharge once medically stable. SW on board for discharge planning.          Patient Active Problem List   Diagnosis Code    GI bleed K92.2       Lowell Foote MD

## 2020-04-29 NOTE — FLOWSHEET NOTE
1830 pt to [address] to room and call light. Resp even and non labored. No complaints of pain or discomfort. Pt here to monitor for rectal bleeding. Admission in progress. No complaints. Electronically signed by Aaliyah Nunez RN on 4/29/2020 at 6:37 PM     1847 returned from bathroom. Full toilet bowl full bright red blood. Electronically signed by Aaliyah Nunez RN on 4/29/2020 at 6:47 PM     1907 Dr Tigre Pham updated on blood in toilet and also off occ bradycardia, PVC, and 5beat NSVT> no new orders.  Electronically signed by Aaliyah Nunez RN on 4/29/2020 at 7:33 PM

## 2020-04-30 LAB
HCT VFR BLD CALC: 28.9 % (ref 42–52)
HCT VFR BLD CALC: 30.4 % (ref 42–52)
HEMOGLOBIN: 9.6 G/DL (ref 14–18)
HEMOGLOBIN: 9.9 G/DL (ref 14–18)

## 2020-04-30 PROCEDURE — 85018 HEMOGLOBIN: CPT

## 2020-04-30 PROCEDURE — 85014 HEMATOCRIT: CPT

## 2020-04-30 PROCEDURE — 6370000000 HC RX 637 (ALT 250 FOR IP): Performed by: INTERNAL MEDICINE

## 2020-04-30 PROCEDURE — 99222 1ST HOSP IP/OBS MODERATE 55: CPT | Performed by: INTERNAL MEDICINE

## 2020-04-30 PROCEDURE — 2060000000 HC ICU INTERMEDIATE R&B

## 2020-04-30 PROCEDURE — 36415 COLL VENOUS BLD VENIPUNCTURE: CPT

## 2020-04-30 PROCEDURE — 2580000003 HC RX 258: Performed by: INTERNAL MEDICINE

## 2020-04-30 RX ORDER — PEG-3350, SODIUM SULFATE, SODIUM CHLORIDE, POTASSIUM CHLORIDE, SODIUM ASCORBATE AND ASCORBIC ACID 7.5-2.691G
100 KIT ORAL ONCE
Status: COMPLETED | OUTPATIENT
Start: 2020-04-30 | End: 2020-04-30

## 2020-04-30 RX ADMIN — Medication 10 ML: at 22:00

## 2020-04-30 RX ADMIN — POLYETHYLENE GLYCOL 3350, SODIUM SULFATE, SODIUM CHLORIDE, POTASSIUM CHLORIDE, ASCORBIC ACID, SODIUM ASCORBATE 100 G: KIT at 09:55

## 2020-04-30 RX ADMIN — LEVOTHYROXINE SODIUM 112 MCG: 112 TABLET ORAL at 05:29

## 2020-04-30 RX ADMIN — Medication 10 ML: at 09:55

## 2020-04-30 ASSESSMENT — PAIN SCALES - GENERAL: PAINLEVEL_OUTOF10: 0

## 2020-04-30 NOTE — PROGRESS NOTES
Hospitalist Progress Note      Date of Admission: 4/29/2020  Chief Complaint:    Chief Complaint   Patient presents with    Rectal Bleeding     started today large red blood with both bowel movements. Subjective:  No new complaints, no nausea vomiting chest pain or headache    Medications:    Infusion Medications   Scheduled Medications    levothyroxine  112 mcg Oral Daily    tamsulosin  0.8 mg Oral Daily    sodium chloride flush  10 mL Intravenous 2 times per day     PRN Meds: sodium chloride flush, acetaminophen **OR** acetaminophen, polyethylene glycol, promethazine **OR** ondansetron    Intake/Output Summary (Last 24 hours) at 4/30/2020 1446  Last data filed at 4/30/2020 0800  Gross per 24 hour   Intake 770 ml   Output --   Net 770 ml     Exam:  /68   Pulse 68   Temp 98.6 °F (37 °C) (Oral)   Resp 18   Ht 6' (1.829 m)   Wt 249 lb (112.9 kg)   SpO2 100%   BMI 33.77 kg/m²   Head: Normocephalic, atraumatic  Sclera clear  Neck supple, nontender  Lungs: Clear    Labs:   Recent Labs     04/29/20  1445 04/30/20  0550   WBC 8.6  --    HGB 11.9* 9.6*   HCT 36.3* 28.9*     --      Recent Labs     04/29/20  1445      K 4.2      CO2 24   BUN 20   CREATININE 1.74*   CALCIUM 8.8   AST 15   ALT <5   BILITOT 0.3   ALKPHOS 52     Recent Labs     04/29/20  1445   INR 1.0     No results for input(s): CKTOTAL, TROPONINI in the last 72 hours. Radiology:  No orders to display     Assessment/Plan:    Acute blood loss anemia secondary to GI bleed    GI bleed: For colonoscopy tomorrow. Receiving prep today.     CKD stage III    35 minutes total care time, >1/2 in unit/floor time and care coordination     Electronically signed by Chivo Marroquin MD on 4/30/2020 at 2:46 PM

## 2020-04-30 NOTE — PROGRESS NOTES
Pt assessed and charted on by this RN. Medications administered. No complaints of pain or discomfort or pain at this time. Pt has not went to bathroom yet.  Will continue to monitor

## 2020-04-30 NOTE — CONSULTS
Inpatient consult to GI  Consult performed by: Piotr Castillo MD  Consult ordered by: Reena Rao MD        Patient Name: Emeline Collet Date: 2020  2:09 PM  MR #: [id number]  : 1930    Attending Physician: Reena Rao MD  Reason for consult: Hematochezia     History of Presenting Illness:      Brady Modi is a 80 y.o. male on hospital day 1 with a history of hypothyroidism    . History Obtained From:  patient  Patient reports hematochezia that started yesterday and progressed throughout the day. This bought him into the ED. He denies rectal pain,  Abdominal pain, N/V. No Fever or chills. Tolerating clears. Patient reports a colonoscopy >10 years ago. No complaints of constipation or diarrhea. Reports a couple of his siblings had colon cancer. No complaints of melena or hematemesis. Patient denies CP, SOB, or palpitations. No weight loss or loss of appetite. History:      History reviewed. No pertinent past medical history. Past Surgical History:   Procedure Laterality Date    HIP SURGERY      THYROIDECTOMY       Family History  History reviewed. No pertinent family history.   Social History     Socioeconomic History    Marital status:      Spouse name: Not on file    Number of children: Not on file    Years of education: Not on file    Highest education level: Not on file   Occupational History    Not on file   Social Needs    Financial resource strain: Not on file    Food insecurity     Worry: Not on file     Inability: Not on file    Transportation needs     Medical: Not on file     Non-medical: Not on file   Tobacco Use    Smoking status: Never Smoker    Smokeless tobacco: Never Used   Substance and Sexual Activity    Alcohol use: Never     Frequency: Never    Drug use: Not on file    Sexual activity: Not on file   Lifestyle    Physical activity     Days per week: Not on file     Minutes per session: Not on file    Stress: Not on file   Relationships    Social connections     Talks on phone: Not on file     Gets together: Not on file     Attends Shinto service: Not on file     Active member of club or organization: Not on file     Attends meetings of clubs or organizations: Not on file     Relationship status: Not on file    Intimate partner violence     Fear of current or ex partner: Not on file     Emotionally abused: Not on file     Physically abused: Not on file     Forced sexual activity: Not on file   Other Topics Concern    Not on file   Social History Narrative    Not on file      Home Medications:      Medications Prior to Admission: finasteride (PROSCAR) 5 MG tablet, Take 1 tablet by mouth daily  levothyroxine (SYNTHROID) 112 MCG tablet, Take 1 tablet by mouth Daily  ASPIRIN 81 PO, Take by mouth  tamsulosin (FLOMAX) 0.4 MG capsule, Take by mouth  montelukast (SINGULAIR) 10 MG tablet, Take by mouth  diclofenac (VOLTAREN) 75 MG EC tablet,   latanoprost (XALATAN) 0.005 % ophthalmic solution, 1 drop  cetirizine (RA CETIRIZINE) 10 MG tablet, Take by mouth  tamsulosin (FLOMAX) 0.4 MG capsule, Take 2 capsules by mouth daily  [DISCONTINUED] levothyroxine (SYNTHROID) 125 MCG tablet,     Current Hospital Medications:   Scheduled Meds:   PEG-KCl-NaCl-NaSulf-Na Asc-C  100 g Oral Once    levothyroxine  112 mcg Oral Daily    tamsulosin  0.8 mg Oral Daily    sodium chloride flush  10 mL Intravenous 2 times per day     Continuous Infusions:  PRN Meds:.sodium chloride flush, acetaminophen **OR** acetaminophen, polyethylene glycol, promethazine **OR** ondansetron     Allergies:      Allergies   Allergen Reactions    Baclofen     Cyclobenzaprine       Review of Systems:       [x] CV, Resp, Neuro, , and all other systems reviewed and negative other than listed in HPI.     Objective Findings:     Vitals:   Vitals:    04/29/20 1700 04/29/20 1823 04/29/20 1942 04/30/20 0721   BP: (!) 149/63 (!) 92/54 (!) 141/61    Pulse: 65 86 58    Resp: 18 16 18 18   Temp:  98.6 °F (37 °C) 97.9 °F (36.6 °C)    TempSrc:  Oral Oral Oral   SpO2: 99% 96% 100%    Weight:       Height:          Physical Examination:  General: No apparent distress, A/O x3  HEENT: Normocephalic, no  scleral icterus. Neck: No JVD. Heart: Regular, no murmur, no rub/gallop. No RV heave. Lungs: Clear to ascultation, no rales/wheezing/rhonchi. Good chest wall excursion. Abdomen: Appearance:, Distension -none, Soft , tenderness -none, Bowel sounds normal   Extremities: No clubbing/cyanosis, no edema. Skin: Warm, dry, normal turgor, no rash, no bruise, no petichiae. Neuro: No myoclonus or tremor. Psych: Normal affect    Results/ Medications reviewed 4/30/2020, 8:29 AM     Laboratory, Microbiology, Pathology, Radiology, Cardiology, Medications and Transcriptions reviewed  Scheduled Meds:   PEG-KCl-NaCl-NaSulf-Na Asc-C  100 g Oral Once    levothyroxine  112 mcg Oral Daily    tamsulosin  0.8 mg Oral Daily    sodium chloride flush  10 mL Intravenous 2 times per day     Continuous Infusions:    Recent Labs     04/29/20  1445 04/30/20  0550   WBC 8.6  --    HGB 11.9* 9.6*   HCT 36.3* 28.9*   .1*  --      --      Recent Labs     04/29/20  1445      K 4.2      CO2 24   BUN 20   CREATININE 1.74*     Recent Labs     04/29/20  1445   AST 15   ALT <5   BILITOT 0.3   ALKPHOS 52     No results for input(s): LIPASE, AMYLASE in the last 72 hours. Recent Labs     04/29/20  1445   PROT 7.0   INR 1.0       Impression:   80year old male patient admitted with hematochezia and acute blood loss anemia. Patient's Hb in February, 2020 was 13. 0. his Hb declined to 9.6 on this admission. No risks factors such as anticoagulation or antiplatelet therapy. Family history of colon cancer. Hemodynamically stable. Patient likely has had diverticular bleed, continue with colon prep  Plan:   - Continue course of care  - Monitor H/H  - Rapid prep  - Plan for colonoscopy 5/1/2020  Comments:      Thank you for allowing us to

## 2020-05-01 ENCOUNTER — ANESTHESIA (OUTPATIENT)
Dept: OPERATING ROOM | Age: 85
DRG: 378 | End: 2020-05-01
Payer: MEDICARE

## 2020-05-01 ENCOUNTER — ANESTHESIA EVENT (OUTPATIENT)
Dept: OPERATING ROOM | Age: 85
DRG: 378 | End: 2020-05-01
Payer: MEDICARE

## 2020-05-01 VITALS — DIASTOLIC BLOOD PRESSURE: 46 MMHG | OXYGEN SATURATION: 100 % | SYSTOLIC BLOOD PRESSURE: 99 MMHG

## 2020-05-01 LAB
ANION GAP SERPL CALCULATED.3IONS-SCNC: 16 MEQ/L (ref 9–15)
BUN BLDV-MCNC: 18 MG/DL (ref 8–23)
CALCIUM SERPL-MCNC: 8 MG/DL (ref 8.5–9.9)
CHLORIDE BLD-SCNC: 104 MEQ/L (ref 95–107)
CO2: 19 MEQ/L (ref 20–31)
CREAT SERPL-MCNC: 1.83 MG/DL (ref 0.7–1.2)
GFR AFRICAN AMERICAN: 42.3
GFR NON-AFRICAN AMERICAN: 34.9
GLUCOSE BLD-MCNC: 95 MG/DL (ref 70–99)
HCT VFR BLD CALC: 26.6 % (ref 42–52)
HCT VFR BLD CALC: 30.5 % (ref 42–52)
HEMOGLOBIN: 10.2 G/DL (ref 14–18)
HEMOGLOBIN: 8.9 G/DL (ref 14–18)
LV EF: 45 %
LVEF MODALITY: NORMAL
MAGNESIUM: 2.1 MG/DL (ref 1.7–2.4)
POTASSIUM SERPL-SCNC: 4.1 MEQ/L (ref 3.4–4.9)
SODIUM BLD-SCNC: 139 MEQ/L (ref 135–144)

## 2020-05-01 PROCEDURE — 3700000001 HC ADD 15 MINUTES (ANESTHESIA): Performed by: INTERNAL MEDICINE

## 2020-05-01 PROCEDURE — 2580000003 HC RX 258: Performed by: ANESTHESIOLOGY

## 2020-05-01 PROCEDURE — 2580000003 HC RX 258: Performed by: INTERNAL MEDICINE

## 2020-05-01 PROCEDURE — 3700000000 HC ANESTHESIA ATTENDED CARE: Performed by: INTERNAL MEDICINE

## 2020-05-01 PROCEDURE — 2709999900 HC NON-CHARGEABLE SUPPLY: Performed by: INTERNAL MEDICINE

## 2020-05-01 PROCEDURE — 7100000000 HC PACU RECOVERY - FIRST 15 MIN: Performed by: INTERNAL MEDICINE

## 2020-05-01 PROCEDURE — 36415 COLL VENOUS BLD VENIPUNCTURE: CPT

## 2020-05-01 PROCEDURE — 7100000001 HC PACU RECOVERY - ADDTL 15 MIN: Performed by: INTERNAL MEDICINE

## 2020-05-01 PROCEDURE — 93306 TTE W/DOPPLER COMPLETE: CPT

## 2020-05-01 PROCEDURE — 85014 HEMATOCRIT: CPT

## 2020-05-01 PROCEDURE — 2060000000 HC ICU INTERMEDIATE R&B

## 2020-05-01 PROCEDURE — 6370000000 HC RX 637 (ALT 250 FOR IP): Performed by: INTERNAL MEDICINE

## 2020-05-01 PROCEDURE — 6360000002 HC RX W HCPCS: Performed by: ANESTHESIOLOGY

## 2020-05-01 PROCEDURE — 0DJD8ZZ INSPECTION OF LOWER INTESTINAL TRACT, VIA NATURAL OR ARTIFICIAL OPENING ENDOSCOPIC: ICD-10-PCS | Performed by: INTERNAL MEDICINE

## 2020-05-01 PROCEDURE — 3609027000 HC COLONOSCOPY: Performed by: INTERNAL MEDICINE

## 2020-05-01 PROCEDURE — 83735 ASSAY OF MAGNESIUM: CPT

## 2020-05-01 PROCEDURE — 2500000003 HC RX 250 WO HCPCS: Performed by: ANESTHESIOLOGY

## 2020-05-01 PROCEDURE — 85018 HEMOGLOBIN: CPT

## 2020-05-01 PROCEDURE — 80048 BASIC METABOLIC PNL TOTAL CA: CPT

## 2020-05-01 RX ORDER — LIDOCAINE HYDROCHLORIDE 20 MG/ML
INJECTION, SOLUTION INFILTRATION; PERINEURAL PRN
Status: DISCONTINUED | OUTPATIENT
Start: 2020-05-01 | End: 2020-05-01 | Stop reason: SDUPTHER

## 2020-05-01 RX ORDER — PROPOFOL 10 MG/ML
INJECTION, EMULSION INTRAVENOUS PRN
Status: DISCONTINUED | OUTPATIENT
Start: 2020-05-01 | End: 2020-05-01 | Stop reason: SDUPTHER

## 2020-05-01 RX ORDER — LIDOCAINE HYDROCHLORIDE 10 MG/ML
1 INJECTION, SOLUTION EPIDURAL; INFILTRATION; INTRACAUDAL; PERINEURAL
Status: DISCONTINUED | OUTPATIENT
Start: 2020-05-01 | End: 2020-05-01 | Stop reason: HOSPADM

## 2020-05-01 RX ORDER — MAGNESIUM HYDROXIDE 1200 MG/15ML
LIQUID ORAL CONTINUOUS PRN
Status: COMPLETED | OUTPATIENT
Start: 2020-05-01 | End: 2020-05-01

## 2020-05-01 RX ORDER — GLYCOPYRROLATE 1 MG/5 ML
0.2 SYRINGE (ML) INTRAVENOUS ONCE
Status: COMPLETED | OUTPATIENT
Start: 2020-05-01 | End: 2020-05-01

## 2020-05-01 RX ORDER — SODIUM CHLORIDE 0.9 % (FLUSH) 0.9 %
10 SYRINGE (ML) INJECTION PRN
Status: DISCONTINUED | OUTPATIENT
Start: 2020-05-01 | End: 2020-05-01 | Stop reason: HOSPADM

## 2020-05-01 RX ORDER — ONDANSETRON 2 MG/ML
4 INJECTION INTRAMUSCULAR; INTRAVENOUS
Status: DISCONTINUED | OUTPATIENT
Start: 2020-05-01 | End: 2020-05-01 | Stop reason: HOSPADM

## 2020-05-01 RX ORDER — SODIUM CHLORIDE 9 MG/ML
INJECTION, SOLUTION INTRAVENOUS CONTINUOUS
Status: DISCONTINUED | OUTPATIENT
Start: 2020-05-01 | End: 2020-05-02 | Stop reason: HOSPADM

## 2020-05-01 RX ORDER — MAGNESIUM HYDROXIDE 1200 MG/15ML
LIQUID ORAL PRN
Status: DISCONTINUED | OUTPATIENT
Start: 2020-05-01 | End: 2020-05-01 | Stop reason: ALTCHOICE

## 2020-05-01 RX ORDER — SODIUM CHLORIDE, SODIUM LACTATE, POTASSIUM CHLORIDE, CALCIUM CHLORIDE 600; 310; 30; 20 MG/100ML; MG/100ML; MG/100ML; MG/100ML
INJECTION, SOLUTION INTRAVENOUS CONTINUOUS
Status: DISCONTINUED | OUTPATIENT
Start: 2020-05-01 | End: 2020-05-02 | Stop reason: HOSPADM

## 2020-05-01 RX ORDER — SODIUM CHLORIDE 0.9 % (FLUSH) 0.9 %
10 SYRINGE (ML) INJECTION EVERY 12 HOURS SCHEDULED
Status: DISCONTINUED | OUTPATIENT
Start: 2020-05-01 | End: 2020-05-01 | Stop reason: HOSPADM

## 2020-05-01 RX ADMIN — Medication 0.2 MG: at 17:27

## 2020-05-01 RX ADMIN — PHENYLEPHRINE HYDROCHLORIDE 100 MCG: 10 INJECTION INTRAVENOUS at 16:51

## 2020-05-01 RX ADMIN — PROPOFOL 40 MG: 10 INJECTION, EMULSION INTRAVENOUS at 16:49

## 2020-05-01 RX ADMIN — LEVOTHYROXINE SODIUM 112 MCG: 112 TABLET ORAL at 05:54

## 2020-05-01 RX ADMIN — Medication 10 ML: at 20:57

## 2020-05-01 RX ADMIN — LIDOCAINE HYDROCHLORIDE 40 MG: 20 INJECTION, SOLUTION INFILTRATION; PERINEURAL at 16:44

## 2020-05-01 RX ADMIN — PROPOFOL 50 MG: 10 INJECTION, EMULSION INTRAVENOUS at 16:44

## 2020-05-01 RX ADMIN — Medication 10 ML: at 07:58

## 2020-05-01 RX ADMIN — PROPOFOL 20 MG: 10 INJECTION, EMULSION INTRAVENOUS at 17:00

## 2020-05-01 RX ADMIN — SODIUM CHLORIDE, POTASSIUM CHLORIDE, SODIUM LACTATE AND CALCIUM CHLORIDE 100 ML/HR: 600; 310; 30; 20 INJECTION, SOLUTION INTRAVENOUS at 14:45

## 2020-05-01 RX ADMIN — TAMSULOSIN HYDROCHLORIDE 0.8 MG: 0.4 CAPSULE ORAL at 20:58

## 2020-05-01 ASSESSMENT — PULMONARY FUNCTION TESTS
PIF_VALUE: 1
PIF_VALUE: 0
PIF_VALUE: 1
PIF_VALUE: 0

## 2020-05-01 ASSESSMENT — PAIN SCALES - GENERAL
PAINLEVEL_OUTOF10: 0
PAINLEVEL_OUTOF10: 0

## 2020-05-01 ASSESSMENT — PAIN - FUNCTIONAL ASSESSMENT: PAIN_FUNCTIONAL_ASSESSMENT: 0-10

## 2020-05-01 NOTE — DISCHARGE SUMMARY
Hospital Medicine Discharge Summary    Dayna Pena  :  1930  MRN:  [id number]    Admit date:  2020  Discharge date:  2020    Admitting Physician:  Charity Amor MD  Primary Care Physician:  Alex Maurice MD    Dayna Pena is a 80 y.o. male that was admitted and treated at [facility] for the following medical issues: Active Problems:    GI bleed  Resolved Problems:    * No resolved hospital problems. *      Discharge Diagnoses: Active Problems:    GI bleed  Resolved Problems:    * No resolved hospital problems. *    Chief Complaint   Patient presents with    Rectal Bleeding     started today large red blood with both bowel movements. Hospital Course:   Dayna Pena is a 80 y.o. male who was admitted to the hospital with GI bleed and acute blood loss anemia. Colonoscopy performed, case discussed with gastroenterology in person after procedure. Diverticular bleed versus hemorrhoids. Okay for discharge with outpatient follow-up  Pt was discharge in a stable condition. BP (!) 128/56   Pulse 72   Temp 97.4 °F (36.3 °C) (Temporal)   Resp 16   Ht 6' (1.829 m)   Wt 249 lb (112.9 kg)   SpO2 97%   BMI 33.77 kg/m²     Patient was seen by the following consultants while admitted to [facility]:   Consults:  Anabel Ramirez HOSPITALIST  IP CONSULT TO GI  IP CONSULT TO GI    Significant Diagnostic Studies:    Refer to chart    Discharge Medications:       Robert Giron   Home Medication Instructions DENISE:[id number]    Printed on:20 7098   Medication Information                      ASPIRIN 81 PO  Take by mouth             cetirizine (RA CETIRIZINE) 10 MG tablet  Take by mouth             finasteride (PROSCAR) 5 MG tablet  Take 1 tablet by mouth daily             hydrocortisone (ANUSOL-HC) 2.5 % CREA rectal cream  Please apply applicator tip. 1 application small amount 3 times a day.              latanoprost (XALATAN) 0.005 % ophthalmic solution  1 drop             levothyroxine (SYNTHROID) 112 MCG tablet  Take 1 tablet by mouth Daily             montelukast (SINGULAIR) 10 MG tablet  Take by mouth             tamsulosin (FLOMAX) 0.4 MG capsule  Take 2 capsules by mouth daily                 Disposition:   If discharged to Home, Any [facility] needs that were indicated and/or required as been addressed and set up by Social Work. Condition at discharge: Pt was medically stable at the time of discharge. Activity: activity as tolerated    Total time taken for discharging this patient: 40 minutes. Greater than 70% of time was spent focused exclusively on this patient. Time was taken to review chart, discuss plans with consultants, reconciling medications, discussing plan answering questions with patient.      Signed:  Lj Monson

## 2020-05-01 NOTE — ANESTHESIA PRE PROCEDURE
injection 10 mL  10 mL Intravenous PRN Ekaterina Sinha MD        acetaminophen (TYLENOL) tablet 650 mg  650 mg Oral Q6H PRN Ekaterina Sinha MD        Or    acetaminophen (TYLENOL) suppository 650 mg  650 mg Rectal Q6H PRN Ekaterina Sinha MD        polyethylene glycol (GLYCOLAX) packet 17 g  17 g Oral Daily PRN Ekaterina Sinha MD        promethazine (PHENERGAN) tablet 12.5 mg  12.5 mg Oral Q6H PRN Ekaterina Sinha MD        Or    ondansetron (ZOFRAN) injection 4 mg  4 mg Intravenous Q6H PRN Ekaterina Sinha MD           Allergies: Allergies   Allergen Reactions    Baclofen     Cyclobenzaprine        Problem List:    Patient Active Problem List   Diagnosis Code    GI bleed K92.2       Past Medical History:  History reviewed. No pertinent past medical history. Past Surgical History:        Procedure Laterality Date    HIP SURGERY      THYROIDECTOMY         Social History:    Social History     Tobacco Use    Smoking status: Never Smoker    Smokeless tobacco: Never Used   Substance Use Topics    Alcohol use: Never     Frequency: Never                                Counseling given: Not Answered      Vital Signs (Current):   Vitals:    04/30/20 1928 05/01/20 0412 05/01/20 0800 05/01/20 1435   BP: 119/71 (!) 135/48 133/74 (!) 145/65   Pulse: 63 111 71 51   Resp: 19  16 18   Temp: 98.2 °F (36.8 °C)  98.2 °F (36.8 °C) 97.9 °F (36.6 °C)   TempSrc: Oral  Oral Temporal   SpO2: 100%  100% 96%   Weight:       Height:                                                  BP Readings from Last 3 Encounters:   05/01/20 (!) 145/65   03/10/20 112/72   02/25/20 122/82       NPO Status:                                                                                 BMI:   Wt Readings from Last 3 Encounters:   04/29/20 249 lb (112.9 kg)   03/10/20 237 lb 6.4 oz (107.7 kg)   02/25/20 240 lb (108.9 kg)     Body mass index is 33.77 kg/m².     CBC:   Lab Results   Component Value Date    WBC 8.6 04/29/2020    RBC 3.62 04/29/2020 HGB 8.9 05/01/2020    HCT 26.6 05/01/2020    .1 04/29/2020    RDW 15.2 04/29/2020     04/29/2020       CMP:   Lab Results   Component Value Date     05/01/2020    K 4.1 05/01/2020     05/01/2020    CO2 19 05/01/2020    BUN 18 05/01/2020    CREATININE 1.83 05/01/2020    GFRAA 42.3 05/01/2020    LABGLOM 34.9 05/01/2020    GLUCOSE 95 05/01/2020    PROT 7.0 04/29/2020    CALCIUM 8.0 05/01/2020    BILITOT 0.3 04/29/2020    ALKPHOS 52 04/29/2020    AST 15 04/29/2020    ALT <5 04/29/2020       POC Tests: No results for input(s): POCGLU, POCNA, POCK, POCCL, POCBUN, POCHEMO, POCHCT in the last 72 hours. Coags:   Lab Results   Component Value Date    PROTIME 14.1 04/29/2020    INR 1.0 04/29/2020    APTT 27.5 04/29/2020       HCG (If Applicable): No results found for: PREGTESTUR, PREGSERUM, HCG, HCGQUANT     ABGs: No results found for: PHART, PO2ART, QOO1WCH, IGY1RMR, BEART, O4BBVKFV     Type & Screen (If Applicable):  No results found for: LABABO, LABRH    Anesthesia Evaluation    Airway: Mallampati: II  TM distance: >3 FB   Neck ROM: full  Mouth opening: > = 3 FB Dental: normal exam         Pulmonary:Negative Pulmonary ROS breath sounds clear to auscultation                             Cardiovascular:    (+) hypertension:,         Rhythm: regular                      Neuro/Psych:   Negative Neuro/Psych ROS              GI/Hepatic/Renal:   (+) bowel prep,           Endo/Other:    (+) hypothyroidism, blood dyscrasia: anemia:., .                 Abdominal:           Vascular: negative vascular ROS. Anesthesia Plan      MAC     ASA 3       Induction: intravenous. MIPS: Prophylactic antiemetics administered. Anesthetic plan and risks discussed with patient. Use of blood products discussed with patient whom consented to blood products. Plan discussed with CRNA.     Attending anesthesiologist reviewed and agrees with Pre Eval content              [facility]

## 2020-05-01 NOTE — FLOWSHEET NOTE
During bed side report, pt states feeling dizzy and requested to put off discharge until AM. Dr. Justo Becerra messaged/okay with holding off on D/C until AM. VS stable. Fall precautions in place. Call light with in reach. Denies additional needs at this time.

## 2020-05-01 NOTE — CARE COORDINATION
Patient independent plans to return home with no needs. Cm/lsw to follow for any new d/c needs or changes to the d/c plan of care.

## 2020-05-02 VITALS
RESPIRATION RATE: 18 BRPM | DIASTOLIC BLOOD PRESSURE: 54 MMHG | BODY MASS INDEX: 33.72 KG/M2 | WEIGHT: 249 LBS | HEART RATE: 57 BPM | SYSTOLIC BLOOD PRESSURE: 122 MMHG | OXYGEN SATURATION: 100 % | TEMPERATURE: 98.1 F | HEIGHT: 72 IN

## 2020-05-02 LAB
HCT VFR BLD CALC: 28.1 % (ref 42–52)
HEMOGLOBIN: 9.4 G/DL (ref 14–18)

## 2020-05-02 PROCEDURE — 6370000000 HC RX 637 (ALT 250 FOR IP): Performed by: INTERNAL MEDICINE

## 2020-05-02 PROCEDURE — 36415 COLL VENOUS BLD VENIPUNCTURE: CPT

## 2020-05-02 PROCEDURE — 85018 HEMOGLOBIN: CPT

## 2020-05-02 PROCEDURE — 85014 HEMATOCRIT: CPT

## 2020-05-02 RX ADMIN — TAMSULOSIN HYDROCHLORIDE 0.8 MG: 0.4 CAPSULE ORAL at 08:27

## 2020-05-02 RX ADMIN — LEVOTHYROXINE SODIUM 112 MCG: 112 TABLET ORAL at 08:31

## 2020-05-02 ASSESSMENT — PAIN SCALES - GENERAL
PAINLEVEL_OUTOF10: 0

## 2020-05-04 ENCOUNTER — TELEPHONE (OUTPATIENT)
Dept: GASTROENTEROLOGY | Age: 85
End: 2020-05-04

## 2020-05-04 RX ORDER — FERROUS SULFATE 325(65) MG
325 TABLET ORAL 2 TIMES DAILY
Qty: 60 TABLET | Refills: 3 | Status: SHIPPED | OUTPATIENT
Start: 2020-05-04 | End: 2020-05-04

## 2020-05-04 RX ORDER — FERROUS SULFATE 325(65) MG
325 TABLET ORAL 2 TIMES DAILY
Qty: 60 TABLET | Refills: 3 | Status: SHIPPED | OUTPATIENT
Start: 2020-05-04 | End: 2020-11-12

## 2020-05-08 ENCOUNTER — VIRTUAL VISIT (OUTPATIENT)
Dept: FAMILY MEDICINE CLINIC | Age: 85
End: 2020-05-08
Payer: MEDICARE

## 2020-05-08 PROCEDURE — 99442 PR PHYS/QHP TELEPHONE EVALUATION 11-20 MIN: CPT | Performed by: INTERNAL MEDICINE

## 2020-05-08 ASSESSMENT — ENCOUNTER SYMPTOMS
EYE DISCHARGE: 0
WHEEZING: 0
CHEST TIGHTNESS: 0
ABDOMINAL DISTENTION: 0
FACIAL SWELLING: 0
CONSTIPATION: 0
SORE THROAT: 0
VOMITING: 0
BACK PAIN: 0
RECTAL PAIN: 0
NAUSEA: 0
SHORTNESS OF BREATH: 0
RHINORRHEA: 0
APNEA: 0
BLOOD IN STOOL: 0
SINUS PRESSURE: 0
EYE REDNESS: 0
VOICE CHANGE: 0
COLOR CHANGE: 0
ABDOMINAL PAIN: 0
PHOTOPHOBIA: 0
DIARRHEA: 0
TROUBLE SWALLOWING: 0
COUGH: 0
EYE ITCHING: 0
EYE PAIN: 0
SINUS PAIN: 0

## 2020-05-08 NOTE — PROGRESS NOTES
2020    Tyler Nunes (:  1930) is a 80 y.o. male, here for evaluation of the following medical concerns:Diverticular bleed and internal hemorroids        Diverticular bleeding and hemorroids: 80 yr old male recently discharged after being assessed and managed for diverticular bleed and internal hemorroids. Since discharge not additional bleeding has been observed by the patient. Hypothyroidism:  He  Is presently compliant with  Synthroid 125 mcg daily. The patients thyroid studies are noted below. Component      Latest Ref Rng & Units 2020           4:34 PM  4:34 PM   TSH      0.440 - 3.860 uIU/mL  133.800 (H)   T4 Free      0.84 - 1.68 ng/dL 0.26 (L)    . Benign prostatic hypertrophy :  The patient is compliant with  Proscar as prescribed. His symptoms of polyuria have improved. His   intermittent lightheadedness is improving     Insomnia: The patient is experiencing insomnia which is controlled with Ambien which he is utilizing sparingly. At present he denies,  Polydipsia, constitutional, sinus, visual, cardiopulmonary, urologic, gastrointestinal, immunologic/hematologic, musculoskeletal, neurologic,dermatologic, or psychiatric complaints. Review of Systems   Constitutional: Negative for chills, diaphoresis, fatigue and fever. HENT: Negative for congestion, dental problem, drooling, ear discharge, ear pain, facial swelling, hearing loss, mouth sores, nosebleeds, postnasal drip, rhinorrhea, sinus pressure, sinus pain, sneezing, sore throat, tinnitus, trouble swallowing and voice change. Eyes: Negative for photophobia, pain, discharge, redness, itching and visual disturbance. Respiratory: Negative for apnea, cough, chest tightness, shortness of breath and wheezing. Cardiovascular: Negative for chest pain, palpitations and leg swelling.    Gastrointestinal: Negative for abdominal distention, abdominal pain, blood in stool, constipation, diarrhea, nausea, rectal pain and vomiting. Endocrine: Negative for cold intolerance, heat intolerance, polydipsia, polyphagia and polyuria. Genitourinary: Negative for decreased urine volume, difficulty urinating, dysuria, flank pain, frequency, genital sores, hematuria and urgency. Musculoskeletal: Negative for arthralgias, back pain, gait problem, joint swelling, myalgias, neck pain and neck stiffness. Skin: Negative for color change, rash and wound. Allergic/Immunologic: Negative for environmental allergies and food allergies. Neurological: Negative for dizziness, tremors, seizures, syncope, facial asymmetry, speech difficulty, weakness, light-headedness, numbness and headaches. Hematological: Negative for adenopathy. Does not bruise/bleed easily. Psychiatric/Behavioral: Negative for agitation, confusion, decreased concentration, hallucinations, self-injury, sleep disturbance and suicidal ideas. The patient is not nervous/anxious. Prior to Visit Medications    Medication Sig Taking? Authorizing Provider   ferrous sulfate (IRON 325) 325 (65 Fe) MG tablet Take 1 tablet by mouth 2 times daily  Radha Land MD   hydrocortisone (ANUSOL-HC) 2.5 % CREA rectal cream Please apply applicator tip. 1 application small amount 3 times a day.   Leila Lindquist,    finasteride (PROSCAR) 5 MG tablet Take 1 tablet by mouth daily  Carolyne Olvera MD   tamsulosin [facility]) 0.4 MG capsule Take 2 capsules by mouth daily  Carolyne Olvera MD   levothyroxine (SYNTHROID) 112 MCG tablet Take 1 tablet by mouth Daily  Carolyne Olvera MD   ASPIRIN 81 PO Take by mouth  Historical Provider, MD   montelukast (SINGULAIR) 10 MG tablet Take by mouth  Historical Provider, MD   latanoprost (XALATAN) 0.005 % ophthalmic solution 1 drop  Historical Provider, MD   cetirizine (RA CETIRIZINE) 10 MG tablet Take by mouth  Historical Provider, MD        Allergies   Allergen Reactions    Baclofen     Cyclobenzaprine        No past medical Coronavirus Preparedness and Response Supplemental Appropriations Act, this Virtual Visit was conducted with patient's (and/or legal guardian's) consent, to reduce the patient's risk of exposure to COVID-19 and provide necessary medical care. The patient (and/or legal guardian) has also been advised to contact this office for worsening conditions or problems, and seek emergency medical treatment and/or call 911 if deemed necessary. Services were provided through a video synchronous discussion virtually to substitute for in-person clinic visit. Type of encounter was __ Doxy __ MyChart _x__Telephone Encounter    Patient was located at their home. Provider was located at their ___ home or        __x__ office. --Dada Weinstein MD on 5/8/2020 at 10:17 AM    An electronic signature was used to authenticate this note. Dada Weinstein       No follow-ups on file. An  electronic signature was used to authenticate this note.     --Dada Weinstein MD on 5/8/2020 at 9:56 AM

## 2020-05-29 ENCOUNTER — VIRTUAL VISIT (OUTPATIENT)
Dept: FAMILY MEDICINE CLINIC | Age: 85
End: 2020-05-29
Payer: COMMERCIAL

## 2020-05-29 PROCEDURE — 1123F ACP DISCUSS/DSCN MKR DOCD: CPT | Performed by: NURSE PRACTITIONER

## 2020-05-29 PROCEDURE — 4040F PNEUMOC VAC/ADMIN/RCVD: CPT | Performed by: NURSE PRACTITIONER

## 2020-05-29 PROCEDURE — G0438 PPPS, INITIAL VISIT: HCPCS | Performed by: NURSE PRACTITIONER

## 2020-05-29 ASSESSMENT — LIFESTYLE VARIABLES: HOW OFTEN DO YOU HAVE A DRINK CONTAINING ALCOHOL: 0

## 2020-05-29 ASSESSMENT — PATIENT HEALTH QUESTIONNAIRE - PHQ9
SUM OF ALL RESPONSES TO PHQ QUESTIONS 1-9: 0
SUM OF ALL RESPONSES TO PHQ QUESTIONS 1-9: 0

## 2020-05-29 NOTE — PROGRESS NOTES
Medicare Annual Wellness Visit  Are Name: Arnol Dunn Date: 2020   MRN: [id number] Sex: Male   Age: 80 y.o. Ethnicity: Non-/Non    : 1930 Race: Black      Faby Vieira is here for Medicare AWV    Screenings for behavioral, psychosocial and functional/safety risks, and cognitive dysfunction are all negative except as indicated below. These results, as well as other patient data from the [address] form, are documented in Flowsheets linked to this Encounter. Allergies   Allergen Reactions    Baclofen     Cyclobenzaprine          Prior to Visit Medications    Medication Sig Taking? Authorizing Provider   ferrous sulfate (IRON 325) 325 (65 Fe) MG tablet Take 1 tablet by mouth 2 times daily  Riana Mckeon MD   finasteride (PROSCAR) 5 MG tablet Take 1 tablet by mouth daily  Yaya Hudson MD   tamsulosin (FLOMAX) 0.4 MG capsule Take 2 capsules by mouth daily  Yaya Hudson MD   levothyroxine (SYNTHROID) 112 MCG tablet Take 1 tablet by mouth Daily  Yaya Hudson MD   ASPIRIN 81 PO Take by mouth  Historical Provider, MD   montelukast (SINGULAIR) 10 MG tablet Take by mouth  Historical Provider, MD   latanoprost (XALATAN) 0.005 % ophthalmic solution 1 drop  Historical Provider, MD   cetirizine (RA CETIRIZINE) 10 MG tablet Take by mouth  Historical Provider, MD       No past medical history on file. Past Surgical History:   Procedure Laterality Date    COLONOSCOPY N/A 2020    COLONOSCOPY ROOM 175 performed by Riana Mckeon MD at [facility]         No family history on file.     CareTeam (Including outside providers/suppliers regularly involved in providing care):   Patient Care Team:  Yaya Hudson MD as PCP - General (Internal Medicine)  Yaya Hudson MD as PCP - [facility] Empaneled Provider    Wt Readings from Last 3 Encounters:   20 249 lb (112.9 kg)   03/10/20 237 lb 6.4 oz (107.7 kg)   20 240 lb (108.9 kg)     There

## 2020-05-29 NOTE — Clinical Note
Patient would like to speak with you about urinary frequency. I asked if I could help him and he would rather speak to you about this.

## 2020-06-11 RX ORDER — ZOLPIDEM TARTRATE 10 MG/1
TABLET ORAL
Qty: 7 TABLET | Refills: 0 | Status: SHIPPED | OUTPATIENT
Start: 2020-06-11 | End: 2020-06-18

## 2020-06-19 ENCOUNTER — VIRTUAL VISIT (OUTPATIENT)
Dept: GASTROENTEROLOGY | Age: 85
End: 2020-06-19
Payer: MEDICARE

## 2020-06-19 PROCEDURE — 99442 PR PHYS/QHP TELEPHONE EVALUATION 11-20 MIN: CPT | Performed by: INTERNAL MEDICINE

## 2020-06-19 NOTE — PROGRESS NOTES
2020    TELEHEALTH EVALUATION -- Audio/Visual (During EQMOK-57 public health emergency)    Due to Patience 19 outbreak, patient's office visit was converted to a virtual visit. Patient was contacted and agreed to proceed with a virtual visit via Telephone Visit  The risks and benefits of converting to a virtual visit were discussed in light of the current infectious disease epidemic. Patient also understood that insurance coverage and co-pays are up to their individual insurance plans. Chief Complaint   Patient presents with    Follow-up     hospital follow up, last colon in may. HPI:  Patient Location: Home  Provider location: Office    Cyn Oliveira (:  1930) for follow-up after recent hospitalization with lower GI bleeding. Patient underwent colonoscopy given significant drop in hemoglobin. On colonoscopy which was performed on 2020 demonstrated pandiverticulosis, significant sized internal and external hemorrhoids. Interval change: Patient denies any further episodes of GI bleeding. His bowels have improved. He has bowel movement once every other day. He denies any straining. Is unclear if he is taking fiber or MiraLAX. Difficult to obtain details over the phone. He additionally reports difficulty with sleeping, was only given 7 pills to help with sleeping, is requesting more. On further review of systems, patient reports needing 20 minutes for urination, suggesting significant prostrate hypertrophy. Review of Systems   All other systems reviewed and are negative. Prior to Visit Medications    Medication Sig Taking?  Authorizing Provider   ferrous sulfate (IRON 325) 325 (65 Fe) MG tablet Take 1 tablet by mouth 2 times daily Yes Annalisa Motta MD   levothyroxine (SYNTHROID) 112 MCG tablet Take 1 tablet by mouth Daily Yes Renay Coleman MD   ASPIRIN 81 PO Take by mouth Yes Historical Provider, MD   montelukast (SINGULAIR) 10 MG tablet Take by mouth Yes Historical Provider, MD latanoprost (XALATAN) 0.005 % ophthalmic solution 1 drop Yes Historical Provider, MD   cetirizine (RA CETIRIZINE) 10 MG tablet Take by mouth Yes Historical Provider, MD   finasteride (PROSCAR) 5 MG tablet Take 1 tablet by mouth daily  Tarik Mobley MD   tamsulosin (FLOMAX) 0.4 MG capsule Take 2 capsules by mouth daily  Tarik Mobley MD       Social History     Tobacco Use    Smoking status: Never Smoker    Smokeless tobacco: Never Used   Substance Use Topics    Alcohol use: Never     Frequency: Never    Drug use: Not on file      PSH, PMH, allergies, FH reviewed and updated    Limited information on physical examination: Due to this being a telehealth visit, physical examination could not be performed    Laboratory, Pathology, Radiology reviewed indetail with relevant important investigations summarized below:  Lab Results   Component Value Date    WBC 8.6 04/29/2020    HGB 9.4 (L) 05/02/2020    HCT 28.1 (L) 05/02/2020    .1 (H) 04/29/2020     04/29/2020     Lab Results   Component Value Date    ALT <5 04/29/2020    AST 15 04/29/2020    ALKPHOS 52 04/29/2020    BILITOT 0.3 04/29/2020     Assessment and Plan:  80 y.o. male with history of lower GI bleed, with significant hemoglobin drop requiring admission in April/May 2020. Patient underwent colonoscopy with findings of pandiverticulosis and significant sized internal and external hemorrhoids. Patient with better bowel movements and no further bleeding since discharge. --Patient advised to continue with fiber supplementation, avoid constipation and use Preparation H/Anusol for hemorrhoids when symptomatic. --For issues with sleep and prostrate, patient advised to liaise with primary care    Return if symptoms worsen or fail to improve.      This visit was completed over telephone, with patient at their home and provider at the hospital, total time spent conversing with the patient 18 minutes, other personnel involved in the care are

## 2020-07-13 ENCOUNTER — OFFICE VISIT (OUTPATIENT)
Dept: FAMILY MEDICINE CLINIC | Age: 85
End: 2020-07-13
Payer: MEDICARE

## 2020-07-13 VITALS
HEART RATE: 54 BPM | TEMPERATURE: 97.9 F | WEIGHT: 235.4 LBS | BODY MASS INDEX: 31.89 KG/M2 | HEIGHT: 72 IN | DIASTOLIC BLOOD PRESSURE: 70 MMHG | SYSTOLIC BLOOD PRESSURE: 132 MMHG | OXYGEN SATURATION: 99 %

## 2020-07-13 PROCEDURE — 4040F PNEUMOC VAC/ADMIN/RCVD: CPT | Performed by: INTERNAL MEDICINE

## 2020-07-13 PROCEDURE — 1123F ACP DISCUSS/DSCN MKR DOCD: CPT | Performed by: INTERNAL MEDICINE

## 2020-07-13 PROCEDURE — G8417 CALC BMI ABV UP PARAM F/U: HCPCS | Performed by: INTERNAL MEDICINE

## 2020-07-13 PROCEDURE — 1036F TOBACCO NON-USER: CPT | Performed by: INTERNAL MEDICINE

## 2020-07-13 PROCEDURE — 99213 OFFICE O/P EST LOW 20 MIN: CPT | Performed by: INTERNAL MEDICINE

## 2020-07-13 PROCEDURE — G8427 DOCREV CUR MEDS BY ELIG CLIN: HCPCS | Performed by: INTERNAL MEDICINE

## 2020-07-13 RX ORDER — TEMAZEPAM 30 MG/1
30 CAPSULE ORAL NIGHTLY PRN
Qty: 14 CAPSULE | Refills: 0 | Status: SHIPPED | OUTPATIENT
Start: 2020-07-13 | End: 2020-07-27

## 2020-07-13 RX ORDER — TEMAZEPAM 30 MG/1
30 CAPSULE ORAL NIGHTLY PRN
COMMUNITY
End: 2020-11-12

## 2020-07-13 ASSESSMENT — ENCOUNTER SYMPTOMS
SINUS PAIN: 0
CHEST TIGHTNESS: 0
ABDOMINAL DISTENTION: 0
SINUS PRESSURE: 0
WHEEZING: 0
EYE REDNESS: 0
VOMITING: 0
DIARRHEA: 0
RECTAL PAIN: 0
RHINORRHEA: 0
CONSTIPATION: 0
EYE ITCHING: 0
COUGH: 0
EYE DISCHARGE: 0
SORE THROAT: 0
BACK PAIN: 0
ABDOMINAL PAIN: 0
VOICE CHANGE: 0
COLOR CHANGE: 0
PHOTOPHOBIA: 0
BLOOD IN STOOL: 0
EYE PAIN: 0
TROUBLE SWALLOWING: 0
APNEA: 0
NAUSEA: 0
FACIAL SWELLING: 0
SHORTNESS OF BREATH: 0

## 2020-07-13 NOTE — PROGRESS NOTES
2020    Elias Charles (:  1930) is a 80 y.o. male, here for evaluation of the following medical concerns: Insomnia      Diverticular bleeding and hemorroids: 80 yr old male recently discharged after being assessed and managed for diverticular bleed and internal hemorroids. The patient has not observed additional bleeding since discharge. Hypothyroidism:  He  Is presently compliant with  Synthroid 125 mcg daily. The patients thyroid studies are noted below. Component      Latest Ref Rng & Units 2020           4:34 PM  4:34 PM   TSH      0.440 - 3.860 uIU/mL  133.800 (H)   T4 Free      0.84 - 1.68 ng/dL 0.26 (L)    . Benign prostatic hypertrophy :  The patient is compliant with  Proscar as prescribed. His symptoms of polyuria have improved. His   intermittent lightheadedness is improving     Insomnia: The patient continues to experience insomnia the patient has tried numerous medications in the past had a benign effective which include melatonin, and other over-the-counter sleep aids. He has been responsive to Restoril in the past.    At present he denies,  Polydipsia, constitutional, sinus, visual, cardiopulmonary, urologic, gastrointestinal, immunologic/hematologic, musculoskeletal, neurologic,dermatologic, or psychiatric complaints. Review of Systems   Constitutional: Negative for chills, diaphoresis, fatigue and fever. HENT: Negative for congestion, dental problem, drooling, ear discharge, ear pain, facial swelling, hearing loss, mouth sores, nosebleeds, postnasal drip, rhinorrhea, sinus pressure, sinus pain, sneezing, sore throat, tinnitus, trouble swallowing and voice change. Eyes: Negative for photophobia, pain, discharge, redness, itching and visual disturbance. Respiratory: Negative for apnea, cough, chest tightness, shortness of breath and wheezing. Cardiovascular: Negative for chest pain, palpitations and leg swelling.    Gastrointestinal: Negative for abdominal distention, abdominal pain, blood in stool, constipation, diarrhea, nausea, rectal pain and vomiting. Endocrine: Negative for cold intolerance, heat intolerance, polydipsia, polyphagia and polyuria. Genitourinary: Negative for decreased urine volume, difficulty urinating, dysuria, flank pain, frequency, genital sores, hematuria and urgency. Musculoskeletal: Negative for arthralgias, back pain, gait problem, joint swelling, myalgias, neck pain and neck stiffness. Skin: Negative for color change, rash and wound. Allergic/Immunologic: Negative for environmental allergies and food allergies. Neurological: Negative for dizziness, tremors, seizures, syncope, facial asymmetry, speech difficulty, weakness, light-headedness, numbness and headaches. Hematological: Negative for adenopathy. Does not bruise/bleed easily. Psychiatric/Behavioral: Negative for agitation, confusion, decreased concentration, hallucinations, self-injury, sleep disturbance and suicidal ideas. The patient is not nervous/anxious. Prior to Visit Medications    Medication Sig Taking? Authorizing Provider   temazepam (RESTORIL) 30 MG capsule Take 30 mg by mouth nightly as needed for Sleep.  Yes Historical Provider, MD   ferrous sulfate (IRON 325) 325 (65 Fe) MG tablet Take 1 tablet by mouth 2 times daily Yes Gloria Parker MD   finasteride (PROSCAR) 5 MG tablet Take 1 tablet by mouth daily Yes Ling Stinson MD   tamsulosin (FLOMAX) 0.4 MG capsule Take 2 capsules by mouth daily Yes Ling Stinson MD   levothyroxine (SYNTHROID) 112 MCG tablet Take 1 tablet by mouth Daily Yes Ling Stinson MD   ASPIRIN 81 PO Take by mouth Yes Historical Provider, MD   montelukast (SINGULAIR) 10 MG tablet Take by mouth Yes Historical Provider, MD   latanoprost (XALATAN) 0.005 % ophthalmic solution 1 drop Yes Historical Provider, MD   cetirizine (RA CETIRIZINE) 10 MG tablet Take by mouth Yes Historical Provider, MD Allergies   Allergen Reactions    Baclofen     Cyclobenzaprine        No past medical history on file. Past Surgical History:   Procedure Laterality Date    COLONOSCOPY N/A 5/1/2020    COLONOSCOPY ROOM 175 performed by Hussein [facility] at [facility]         Social History     Socioeconomic History    Marital status:      Spouse name: Not on file    Number of children: Not on file    Years of education: Not on file    Highest education level: Not on file   Occupational History    Not on file   Social Needs    Financial resource strain: Not on file    Food insecurity     Worry: Not on file     Inability: Not on file    Transportation needs     Medical: Not on file     Non-medical: Not on file   Tobacco Use    Smoking status: Never Smoker    Smokeless tobacco: Never Used   Substance and Sexual Activity    Alcohol use: Never     Frequency: Never    Drug use: Not on file    Sexual activity: Not on file   Lifestyle    Physical activity     Days per week: Not on file     Minutes per session: Not on file    Stress: Not on file   Relationships    Social connections     Talks on phone: Not on file     Gets together: Not on file     Attends Orthodox service: Not on file     Active member of club or organization: Not on file     Attends meetings of clubs or organizations: Not on file     Relationship status: Not on file    Intimate partner violence     Fear of current or ex partner: Not on file     Emotionally abused: Not on file     Physically abused: Not on file     Forced sexual activity: Not on file   Other Topics Concern    Not on file   Social History Narrative    Not on file        No family history on file.     Vitals:    07/13/20 1332   BP: 132/70   Site: Right Upper Arm   Position: Sitting   Cuff Size: Medium Adult   Pulse: 54   Temp: 97.9 °F (36.6 °C)   TempSrc: Temporal   SpO2: 99%   Weight: 235 lb 6.4 oz (106.8 kg)   Height: 6' (1.829 m) Estimated body mass index is 31.93 kg/m² as calculated from the following:    Height as of this encounter: 6' (1.829 m). Weight as of this encounter: 235 lb 6.4 oz (106.8 kg). ASSESSMENT/PLAN:    Insomnia- Continue Ambien. Oars report reviewed. In the past I have had a great reservations about utilizing benzodiazepines to help the patient to achieve sleep. Since he has attempted to utilize numerous over-the-counter medications without success and is willing to implement lifestyle changes I will consider temporary use of Restoril to help the patient achieve sleep short-term. Diverticulosis-continue to promote intake of fiber, monitoring H/H. continue holding aspirin. Acute Anemia-stable, monitoring        Hypothyroidism, unspecified type  -Encouraged compliance with Synthroid. Repeat thyroid level in 3 months. Benign prostatic hyperplasia without lower urinary tract symptoms-Continue finasteride. No follow-ups on file. An  electronic signature was used to authenticate this note.     --Terry Newman MD on 7/13/2020 at 1:44 PM

## 2020-07-28 ENCOUNTER — VIRTUAL VISIT (OUTPATIENT)
Dept: FAMILY MEDICINE CLINIC | Age: 85
End: 2020-07-28
Payer: MEDICARE

## 2020-07-28 PROCEDURE — 99441 PR PHYS/QHP TELEPHONE EVALUATION 5-10 MIN: CPT | Performed by: INTERNAL MEDICINE

## 2020-07-28 ASSESSMENT — ENCOUNTER SYMPTOMS
DIARRHEA: 0
ABDOMINAL PAIN: 0
SINUS PRESSURE: 0
FACIAL SWELLING: 0
SINUS PAIN: 0
BLOOD IN STOOL: 0
EYE REDNESS: 0
CONSTIPATION: 0
WHEEZING: 0
CHEST TIGHTNESS: 0
RECTAL PAIN: 0
APNEA: 0
SORE THROAT: 0
VOMITING: 0
TROUBLE SWALLOWING: 0
COLOR CHANGE: 0
COUGH: 0
EYE DISCHARGE: 0
EYE ITCHING: 0
EYE PAIN: 0
SHORTNESS OF BREATH: 0
ABDOMINAL DISTENTION: 0
VOICE CHANGE: 0
NAUSEA: 0
RHINORRHEA: 0
PHOTOPHOBIA: 0
BACK PAIN: 0

## 2020-07-28 NOTE — PROGRESS NOTES
2020    Cortney Canales (:  1930) is a 80 y.o. male, here for evaluation of the following medical concerns: Insomnia      Diverticular bleeding and hemorroids:    No additional bleeding has been observed since being discharged from the hospital.    Hypothyroidism:  He  Is presently compliant with  Synthroid 125 mcg daily. The patients thyroid studies are noted below. Component      Latest Ref Rng & Units 2020           4:34 PM  4:34 PM   TSH      0.440 - 3.860 uIU/mL  133.800 (H)   T4 Free      0.84 - 1.68 ng/dL 0.26 (L)    . Benign prostatic hypertrophy :  The patient is compliant with  Proscar as prescribed. His symptoms of polyuria have improved. His   intermittent lightheadedness is improving         Insomnia: The patient reports that his insomnia symptoms have begun to improve. Effective which include melatonin, and other over-the-counter sleep aids. He has been responsive to Restoril in the past.    At present he denies,  Polydipsia, constitutional, sinus, visual, cardiopulmonary, urologic, gastrointestinal, immunologic/hematologic, musculoskeletal, neurologic,dermatologic, or psychiatric complaints. Review of Systems   Constitutional: Negative for chills, diaphoresis, fatigue and fever. HENT: Negative for congestion, dental problem, drooling, ear discharge, ear pain, facial swelling, hearing loss, mouth sores, nosebleeds, postnasal drip, rhinorrhea, sinus pressure, sinus pain, sneezing, sore throat, tinnitus, trouble swallowing and voice change. Eyes: Negative for photophobia, pain, discharge, redness, itching and visual disturbance. Respiratory: Negative for apnea, cough, chest tightness, shortness of breath and wheezing. Cardiovascular: Negative for chest pain, palpitations and leg swelling. Gastrointestinal: Negative for abdominal distention, abdominal pain, blood in stool, constipation, diarrhea, nausea, rectal pain and vomiting.    Endocrine: Negative for cold intolerance, heat intolerance, polydipsia, polyphagia and polyuria. Genitourinary: Negative for decreased urine volume, difficulty urinating, dysuria, flank pain, frequency, genital sores, hematuria and urgency. Musculoskeletal: Negative for arthralgias, back pain, gait problem, joint swelling, myalgias, neck pain and neck stiffness. Skin: Negative for color change, rash and wound. Allergic/Immunologic: Negative for environmental allergies and food allergies. Neurological: Negative for dizziness, tremors, seizures, syncope, facial asymmetry, speech difficulty, weakness, light-headedness, numbness and headaches. Hematological: Negative for adenopathy. Does not bruise/bleed easily. Psychiatric/Behavioral: Negative for agitation, confusion, decreased concentration, hallucinations, self-injury, sleep disturbance and suicidal ideas. The patient is not nervous/anxious. Prior to Visit Medications    Medication Sig Taking? Authorizing Provider   temazepam (RESTORIL) 30 MG capsule Take 30 mg by mouth nightly as needed for Sleep. Historical Provider, MD   ferrous sulfate (IRON 325) 325 (65 Fe) MG tablet Take 1 tablet by mouth 2 times daily  Juan J Win MD   finasteride (PROSCAR) 5 MG tablet Take 1 tablet by mouth daily  Mt Martin MD   tamsulosin (FLOMAX) 0.4 MG capsule Take 2 capsules by mouth daily  Mt Martin MD   levothyroxine (SYNTHROID) 112 MCG tablet Take 1 tablet by mouth Daily  Mt Martin MD   ASPIRIN 81 PO Take by mouth  Historical Provider, MD   montelukast (SINGULAIR) 10 MG tablet Take by mouth  Historical Provider, MD   latanoprost (XALATAN) 0.005 % ophthalmic solution 1 drop  Historical Provider, MD   cetirizine (RA CETIRIZINE) 10 MG tablet Take by mouth  Historical Provider, MD        Allergies   Allergen Reactions    Baclofen     Cyclobenzaprine        No past medical history on file.     Past Surgical History:   Procedure Laterality Date    COLONOSCOPY N/A 5/1/2020    COLONOSCOPY ROOM 175 performed by Lulú Sarkar MD at [facility]         Social History     Socioeconomic History    Marital status:      Spouse name: Not on file    Number of children: Not on file    Years of education: Not on file    Highest education level: Not on file   Occupational History    Not on file   Social Needs    Financial resource strain: Not on file    Food insecurity     Worry: Not on file     Inability: Not on file    Transportation needs     Medical: Not on file     Non-medical: Not on file   Tobacco Use    Smoking status: Never Smoker    Smokeless tobacco: Never Used   Substance and Sexual Activity    Alcohol use: Never     Frequency: Never    Drug use: Not on file    Sexual activity: Not on file   Lifestyle    Physical activity     Days per week: Not on file     Minutes per session: Not on file    Stress: Not on file   Relationships    Social connections     Talks on phone: Not on file     Gets together: Not on file     Attends Jewish service: Not on file     Active member of club or organization: Not on file     Attends meetings of clubs or organizations: Not on file     Relationship status: Not on file    Intimate partner violence     Fear of current or ex partner: Not on file     Emotionally abused: Not on file     Physically abused: Not on file     Forced sexual activity: Not on file   Other Topics Concern    Not on file   Social History Narrative    Not on file        No family history on file. There were no vitals filed for this visit. Estimated body mass index is 31.93 kg/m² as calculated from the following:    Height as of 7/13/20: 6' (1.829 m). Weight as of 7/13/20: 235 lb 6.4 oz (106.8 kg). ASSESSMENT/PLAN:    Insomnia- Continue Ambien. Oars report reviewed. In the past I have had a great reservations about utilizing benzodiazepines to help the patient to achieve sleep.   Since he has attempted to utilize numerous over-the-counter medications without success and is willing to implement lifestyle changes I will consider temporary use of Restoril to help the patient achieve sleep short-term. Diverticulosis-continue to promote intake of fiber, monitoring H/H. continue holding aspirin. Acute Anemia-stable, monitoring        Hypothyroidism, unspecified type  -Encouraged compliance with Synthroid. Repeat thyroid level in 3 months. Benign prostatic hyperplasia without lower urinary tract symptoms-Continue finasteride. No follow-ups on file. An  electronic signature was used to authenticate this note.     --Braulio Martins MD on 7/28/2020 at 9:38 AM

## 2020-10-29 ENCOUNTER — VIRTUAL VISIT (OUTPATIENT)
Dept: FAMILY MEDICINE CLINIC | Age: 85
End: 2020-10-29
Payer: MEDICARE

## 2020-10-29 PROCEDURE — 99442 PR PHYS/QHP TELEPHONE EVALUATION 11-20 MIN: CPT | Performed by: INTERNAL MEDICINE

## 2020-10-29 ASSESSMENT — ENCOUNTER SYMPTOMS
ABDOMINAL DISTENTION: 0
SINUS PRESSURE: 0
EYE PAIN: 0
EYE REDNESS: 0
EYE DISCHARGE: 0
COLOR CHANGE: 0
SHORTNESS OF BREATH: 0
VOMITING: 0
SORE THROAT: 0
BACK PAIN: 0
VOICE CHANGE: 0
CHEST TIGHTNESS: 0
BLOOD IN STOOL: 0
FACIAL SWELLING: 0
WHEEZING: 0
CONSTIPATION: 0
ABDOMINAL PAIN: 0
SINUS PAIN: 0
TROUBLE SWALLOWING: 0
PHOTOPHOBIA: 0
NAUSEA: 0
EYE ITCHING: 0
RHINORRHEA: 0
APNEA: 0
DIARRHEA: 0
RECTAL PAIN: 0
COUGH: 0

## 2020-10-29 NOTE — PROGRESS NOTES
10/29/2020    West Cunningham (:  1930) is a 80 y.o. male, here for evaluation of the following medical concerns: Dizziness. 45-year-old male presents with a complaint of dizziness. Dizziness: The patient reports dizziness upon standing. He is presently receiving Flomax. He additionally has a history of a gastrointestinal bleed. He denies additional neurologic or cardiopulmonary symptoms. Diverticular bleeding and hemorroids:    No additional bleeding has been observed since being discharged from the hospital.        Hypothyroidism:  He  Is presently compliant with  Synthroid 125 mcg daily. The patients thyroid studies are noted below. Component      Latest Ref Rng & Units 2020           4:34 PM  4:34 PM   TSH      0.440 - 3.860 uIU/mL  133.800 (H)   T4 Free      0.84 - 1.68 ng/dL 0.26 (L)    . Benign prostatic hypertrophy :  The patient is compliant with  Proscar as prescribed. His symptoms of polyuria have improved. His   intermittent lightheadedness is improving         Insomnia: The patient reports that his insomnia symptoms have begun to improve. Effective which include melatonin, and other over-the-counter sleep aids. He has been responsive to Restoril in the past.    At present he denies,  Polydipsia, constitutional, sinus, visual, cardiopulmonary, urologic, gastrointestinal, immunologic/hematologic, musculoskeletal, neurologic,dermatologic, or psychiatric complaints. Review of Systems   Constitutional: Negative for chills, diaphoresis, fatigue and fever. HENT: Negative for congestion, dental problem, drooling, ear discharge, ear pain, facial swelling, hearing loss, mouth sores, nosebleeds, postnasal drip, rhinorrhea, sinus pressure, sinus pain, sneezing, sore throat, tinnitus, trouble swallowing and voice change. Eyes: Negative for photophobia, pain, discharge, redness, itching and visual disturbance.    Respiratory: Negative for apnea, cough, chest Historical Provider, MD   cetirizine (RA CETIRIZINE) 10 MG tablet Take by mouth  Historical Provider, MD        Allergies   Allergen Reactions    Baclofen     Cyclobenzaprine        No past medical history on file. Past Surgical History:   Procedure Laterality Date    COLONOSCOPY N/A 5/1/2020    COLONOSCOPY ROOM 175 performed by Chivo Phillip MD at [facility]         Social History     Socioeconomic History    Marital status:      Spouse name: Not on file    Number of children: Not on file    Years of education: Not on file    Highest education level: Not on file   Occupational History    Not on file   Social Needs    Financial resource strain: Not on file    Food insecurity     Worry: Not on file     Inability: Not on file    Transportation needs     Medical: Not on file     Non-medical: Not on file   Tobacco Use    Smoking status: Never Smoker    Smokeless tobacco: Never Used   Substance and Sexual Activity    Alcohol use: Never     Frequency: Never    Drug use: Not on file    Sexual activity: Not on file   Lifestyle    Physical activity     Days per week: Not on file     Minutes per session: Not on file    Stress: Not on file   Relationships    Social connections     Talks on phone: Not on file     Gets together: Not on file     Attends Druze service: Not on file     Active member of club or organization: Not on file     Attends meetings of clubs or organizations: Not on file     Relationship status: Not on file    Intimate partner violence     Fear of current or ex partner: Not on file     Emotionally abused: Not on file     Physically abused: Not on file     Forced sexual activity: Not on file   Other Topics Concern    Not on file   Social History Narrative    Not on file        No family history on file. There were no vitals filed for this visit.   Estimated body mass index is 31.93 kg/m² as calculated from the following:    Height as of COVID-19 and provide necessary medical care. The patient (and/or legal guardian) has also been advised to contact this office for worsening conditions or problems, and seek emergency medical treatment and/or call 911 if deemed necessary. Services were provided through a video synchronous discussion virtually to substitute for in-person clinic visit. Type of encounter was _x_telephone encounter__ MyChart ___Facetime    Patient was located at their home. Provider was located at their _x__ home or        ____ office. Cleo Felipe is a 80 y.o. male evaluated via telephone on 10/29/2020. Consent:  He and/or health care decision maker is aware that that he may receive a bill for this telephone service, depending on his insurance coverage, and has provided verbal consent to proceed: Yes      Documentation:  I communicated with the patient and/or health care decision maker about dizziness. Details of this discussion including any medical advice provided: Please refer to note above      I affirm this is a Patient Initiated Episode with a Patient who has not had a related appointment within my department in the past 7 days or scheduled within the next 24 hours. Patient identification was verified at the start of the visit: Yes    Total Time: minutes: 5-10 minutes    Note: not billable if this call serves to triage the patient into an appointment for the relevant concern      Keyanna Zhang MD on 10/29/2020 at 3:16 PM    An electronic signature was used to authenticate this note    No follow-ups on file. An  electronic signature was used to authenticate this note.     --Ciera Gordon MD on 10/29/2020 at 2:53 PM

## 2020-11-12 ENCOUNTER — OFFICE VISIT (OUTPATIENT)
Dept: FAMILY MEDICINE CLINIC | Age: 85
End: 2020-11-12
Payer: MEDICARE

## 2020-11-12 VITALS
WEIGHT: 237.4 LBS | BODY MASS INDEX: 32.15 KG/M2 | RESPIRATION RATE: 14 BRPM | SYSTOLIC BLOOD PRESSURE: 130 MMHG | HEIGHT: 72 IN | DIASTOLIC BLOOD PRESSURE: 80 MMHG | TEMPERATURE: 98 F | HEART RATE: 59 BPM | OXYGEN SATURATION: 96 %

## 2020-11-12 DIAGNOSIS — R35.0 FREQUENCY OF URINATION: ICD-10-CM

## 2020-11-12 DIAGNOSIS — R42 DIZZINESS: ICD-10-CM

## 2020-11-12 DIAGNOSIS — D64.9 ANEMIA, UNSPECIFIED TYPE: ICD-10-CM

## 2020-11-12 LAB
ANION GAP SERPL CALCULATED.3IONS-SCNC: 10 MEQ/L (ref 9–15)
BILIRUBIN, POC: NORMAL
BLOOD URINE, POC: NORMAL
BUN BLDV-MCNC: 17 MG/DL (ref 8–23)
CALCIUM SERPL-MCNC: 8.8 MG/DL (ref 8.5–9.9)
CHLORIDE BLD-SCNC: 106 MEQ/L (ref 95–107)
CLARITY, POC: NORMAL
CO2: 27 MEQ/L (ref 20–31)
COLOR, POC: YELLOW
CREAT SERPL-MCNC: 2.02 MG/DL (ref 0.7–1.2)
FERRITIN: 120 NG/ML (ref 30–400)
GFR AFRICAN AMERICAN: 37.7
GFR NON-AFRICAN AMERICAN: 31.1
GLUCOSE BLD-MCNC: 108 MG/DL (ref 70–99)
GLUCOSE URINE, POC: NORMAL
IRON SATURATION: 30 % (ref 11–46)
IRON: 66 UG/DL (ref 59–158)
KETONES, POC: NORMAL
LEUKOCYTE EST, POC: NORMAL
NITRITE, POC: NORMAL
PH, POC: 7
POTASSIUM SERPL-SCNC: 4.4 MEQ/L (ref 3.4–4.9)
PROTEIN, POC: NORMAL
SODIUM BLD-SCNC: 143 MEQ/L (ref 135–144)
SPECIFIC GRAVITY, POC: 1.02
TOTAL IRON BINDING CAPACITY: 219 UG/DL (ref 178–450)
UROBILINOGEN, POC: NORMAL

## 2020-11-12 PROCEDURE — 4040F PNEUMOC VAC/ADMIN/RCVD: CPT | Performed by: INTERNAL MEDICINE

## 2020-11-12 PROCEDURE — G8427 DOCREV CUR MEDS BY ELIG CLIN: HCPCS | Performed by: INTERNAL MEDICINE

## 2020-11-12 PROCEDURE — 99214 OFFICE O/P EST MOD 30 MIN: CPT | Performed by: INTERNAL MEDICINE

## 2020-11-12 PROCEDURE — 1036F TOBACCO NON-USER: CPT | Performed by: INTERNAL MEDICINE

## 2020-11-12 PROCEDURE — G8482 FLU IMMUNIZE ORDER/ADMIN: HCPCS | Performed by: INTERNAL MEDICINE

## 2020-11-12 PROCEDURE — 81003 URINALYSIS AUTO W/O SCOPE: CPT | Performed by: INTERNAL MEDICINE

## 2020-11-12 PROCEDURE — G8417 CALC BMI ABV UP PARAM F/U: HCPCS | Performed by: INTERNAL MEDICINE

## 2020-11-12 PROCEDURE — 1123F ACP DISCUSS/DSCN MKR DOCD: CPT | Performed by: INTERNAL MEDICINE

## 2020-11-12 RX ORDER — FERROUS SULFATE 325(65) MG
325 TABLET ORAL 2 TIMES DAILY
Qty: 60 TABLET | Refills: 3 | Status: SHIPPED | OUTPATIENT
Start: 2020-11-12 | End: 2021-05-27 | Stop reason: SDUPTHER

## 2020-11-12 RX ORDER — LEVOTHYROXINE SODIUM 112 UG/1
112 TABLET ORAL DAILY
Qty: 90 TABLET | Refills: 3 | Status: SHIPPED | OUTPATIENT
Start: 2020-11-12 | End: 2022-01-04 | Stop reason: SDUPTHER

## 2020-11-12 RX ORDER — FINASTERIDE 5 MG/1
5 TABLET, FILM COATED ORAL DAILY
Qty: 30 TABLET | Refills: 3 | Status: SHIPPED | OUTPATIENT
Start: 2020-11-12 | End: 2021-05-27

## 2020-11-13 ASSESSMENT — ENCOUNTER SYMPTOMS
VOICE CHANGE: 0
FACIAL SWELLING: 0
SINUS PAIN: 0
BLOOD IN STOOL: 0
ABDOMINAL DISTENTION: 0
COLOR CHANGE: 0
SINUS PRESSURE: 0
COUGH: 0
RHINORRHEA: 0
CHEST TIGHTNESS: 0
EYE PAIN: 0
SHORTNESS OF BREATH: 0
RECTAL PAIN: 0
PHOTOPHOBIA: 0
TROUBLE SWALLOWING: 0
EYE REDNESS: 0
EYE DISCHARGE: 0
NAUSEA: 0
EYE ITCHING: 0
SORE THROAT: 0
BACK PAIN: 0
WHEEZING: 0
VOMITING: 0
DIARRHEA: 0
APNEA: 0
CONSTIPATION: 0
ABDOMINAL PAIN: 0

## 2020-11-13 NOTE — PROGRESS NOTES
2020    Balbina Chan (:  1930) is a 80 y.o. male, here for evaluation of the following medical concerns: Dizziness. 75-year-old male presents with a complaint of dizziness. Dizziness: The patient reports dizziness upon standing. Previously the patient's Flomax was discontinued due to this complaint. He additionally has a history of a gastrointestinal bleed. He denies additional neurologic or cardiopulmonary symptoms. Diverticular bleeding and hemorroids:    No additional bleeding has been observed since being discharged from the hospital.        Hypothyroidism:  The patient has not been taking his Synthroid 125 mcg daily. The patients thyroid studies are noted below. Component      Latest Ref Rng & Units 2020           4:34 PM  4:34 PM   TSH      0.440 - 3.860 uIU/mL  133.800 (H)   T4 Free      0.84 - 1.68 ng/dL 0.26 (L)    . Benign prostatic hypertrophy :  The patient was previously prescribed Proscar however this medication is being held due to his complaint of dizziness his symptoms of polyuria have improved. Insomnia: The patient reports that his insomnia symptoms have begun to improve. Effective which include melatonin, and other over-the-counter sleep aids. He has been responsive to Restoril in the past.    At present he denies,  Polydipsia, constitutional, sinus, visual, cardiopulmonary, urologic, gastrointestinal, immunologic/hematologic, musculoskeletal, neurologic,dermatologic, or psychiatric complaints. Review of Systems   Constitutional: Negative for chills, diaphoresis, fatigue and fever. HENT: Negative for congestion, dental problem, drooling, ear discharge, ear pain, facial swelling, hearing loss, mouth sores, nosebleeds, postnasal drip, rhinorrhea, sinus pressure, sinus pain, sneezing, sore throat, tinnitus, trouble swallowing and voice change.     Eyes: Negative for photophobia, pain, discharge, redness, itching and visual COLONOSCOPY N/A 5/1/2020    COLONOSCOPY ROOM 175 performed by Stefania Moulton MD at [facility]         Social History     Socioeconomic History    Marital status:      Spouse name: Not on file    Number of children: Not on file    Years of education: Not on file    Highest education level: Not on file   Occupational History    Not on file   Social Needs    Financial resource strain: Not on file    Food insecurity     Worry: Not on file     Inability: Not on file    Transportation needs     Medical: Not on file     Non-medical: Not on file   Tobacco Use    Smoking status: Never Smoker    Smokeless tobacco: Never Used   Substance and Sexual Activity    Alcohol use: Never     Frequency: Never    Drug use: Not on file    Sexual activity: Not on file   Lifestyle    Physical activity     Days per week: Not on file     Minutes per session: Not on file    Stress: Not on file   Relationships    Social connections     Talks on phone: Not on file     Gets together: Not on file     Attends Baptist service: Not on file     Active member of club or organization: Not on file     Attends meetings of clubs or organizations: Not on file     Relationship status: Not on file    Intimate partner violence     Fear of current or ex partner: Not on file     Emotionally abused: Not on file     Physically abused: Not on file     Forced sexual activity: Not on file   Other Topics Concern    Not on file   Social History Narrative    Not on file        No family history on file. Vitals:    11/12/20 1430   BP: 130/80   Pulse: 59   Resp: 14   Temp: 98 °F (36.7 °C)   SpO2: 96%   Weight: 237 lb 6.4 oz (107.7 kg)   Height: 6' (1.829 m)     Estimated body mass index is 32.2 kg/m² as calculated from the following:    Height as of this encounter: 6' (1.829 m). Weight as of this encounter: 237 lb 6.4 oz (107.7 kg).   Physical Exam  Constitutional:       General: He is not in acute distress. Appearance: He is well-developed. HENT:      Head: Normocephalic. Right Ear: External ear normal.      Left Ear: External ear normal.   Eyes:      Conjunctiva/sclera: Conjunctivae normal.   Neck:      Musculoskeletal: Neck supple. Vascular: No JVD. Trachea: No tracheal deviation. Cardiovascular:      Rate and Rhythm: Normal rate and regular rhythm. Heart sounds: Normal heart sounds. Pulmonary:      Effort: Pulmonary effort is normal. No respiratory distress. Breath sounds: Normal breath sounds. No wheezing or rales. Chest:      Chest wall: No tenderness. Abdominal:      General: Bowel sounds are normal. There is no distension. Palpations: Abdomen is soft. There is no mass. Tenderness: There is no abdominal tenderness. There is no guarding or rebound. Musculoskeletal:         General: No tenderness or deformity. Skin:     General: Skin is warm and dry. Coloration: Skin is not pale. Findings: No erythema or rash. Neurological:      Mental Status: He is alert and oriented to person, place, and time. Motor: No abnormal muscle tone. Psychiatric:         Thought Content: Thought content normal.         Judgment: Judgment normal.             ASSESSMENT/PLAN:  Dizziness: Blood pressure is normal at this time. Will check complete blood cell count and a basic metabolic panel to exclude anemia and hyponatremia as a cause of his dizziness. Insomnia- Continue Ambien. Oars report reviewed. In the past I have had a great reservations about utilizing benzodiazepines to help the patient to achieve sleep. Since he has attempted to utilize numerous over-the-counter medications without success and is willing to implement lifestyle changes I will consider temporary use of Restoril to help the patient achieve sleep short-term. Diverticulosis-continue to promote intake of fiber, monitoring H/H. continue holding aspirin.         Acute Anemia-stable, monitoring        Hypothyroidism, unspecified type  -Encouraged compliance with Synthroid. Repeat thyroid level in 3 months. Benign prostatic hyperplasia without lower urinary tract symptoms-resume Proscar      Tracy Cruz MD on 11/13/2020 at 5:49 AM    An electronic signature was used to authenticate this note    Return in about 1 week (around 11/19/2020). An  electronic signature was used to authenticate this note.     --Bandar Paul MD on 11/13/2020 at 5:49 AM

## 2021-05-27 ENCOUNTER — OFFICE VISIT (OUTPATIENT)
Dept: FAMILY MEDICINE CLINIC | Age: 86
End: 2021-05-27
Payer: MEDICARE

## 2021-05-27 VITALS
SYSTOLIC BLOOD PRESSURE: 138 MMHG | OXYGEN SATURATION: 97 % | HEIGHT: 72 IN | DIASTOLIC BLOOD PRESSURE: 80 MMHG | BODY MASS INDEX: 31.83 KG/M2 | TEMPERATURE: 97 F | RESPIRATION RATE: 14 BRPM | WEIGHT: 235 LBS | HEART RATE: 59 BPM

## 2021-05-27 DIAGNOSIS — D64.9 ANEMIA, UNSPECIFIED TYPE: ICD-10-CM

## 2021-05-27 DIAGNOSIS — E03.9 HYPOTHYROIDISM, UNSPECIFIED TYPE: ICD-10-CM

## 2021-05-27 DIAGNOSIS — Z91.89 DRIVING SAFETY ISSUE: Primary | ICD-10-CM

## 2021-05-27 PROCEDURE — 99213 OFFICE O/P EST LOW 20 MIN: CPT | Performed by: INTERNAL MEDICINE

## 2021-05-27 RX ORDER — TAMSULOSIN HYDROCHLORIDE 0.4 MG/1
CAPSULE ORAL
COMMUNITY
End: 2021-05-27

## 2021-05-27 RX ORDER — FERROUS SULFATE 325(65) MG
325 TABLET ORAL 2 TIMES DAILY
Qty: 60 TABLET | Refills: 3 | Status: SHIPPED | OUTPATIENT
Start: 2021-05-27 | End: 2022-03-18 | Stop reason: SDUPTHER

## 2021-05-27 RX ORDER — TEMAZEPAM 30 MG/1
CAPSULE ORAL
COMMUNITY
End: 2021-05-27

## 2021-05-27 SDOH — ECONOMIC STABILITY: FOOD INSECURITY: WITHIN THE PAST 12 MONTHS, THE FOOD YOU BOUGHT JUST DIDN'T LAST AND YOU DIDN'T HAVE MONEY TO GET MORE.: NEVER TRUE

## 2021-05-27 ASSESSMENT — ENCOUNTER SYMPTOMS
SINUS PAIN: 0
CHEST TIGHTNESS: 0
CONSTIPATION: 0
VOICE CHANGE: 0
FACIAL SWELLING: 0
EYE REDNESS: 0
EYE DISCHARGE: 0
COLOR CHANGE: 0
EYE ITCHING: 0
EYE PAIN: 0
NAUSEA: 0
PHOTOPHOBIA: 0
RHINORRHEA: 0
RECTAL PAIN: 0
BLOOD IN STOOL: 0
ABDOMINAL DISTENTION: 0
SINUS PRESSURE: 0
DIARRHEA: 0
APNEA: 0
SORE THROAT: 0
COUGH: 0
ABDOMINAL PAIN: 0
TROUBLE SWALLOWING: 0
SHORTNESS OF BREATH: 0
VOMITING: 0
BACK PAIN: 0
WHEEZING: 0

## 2021-05-27 ASSESSMENT — SOCIAL DETERMINANTS OF HEALTH (SDOH): HOW HARD IS IT FOR YOU TO PAY FOR THE VERY BASICS LIKE FOOD, HOUSING, MEDICAL CARE, AND HEATING?: NOT VERY HARD

## 2021-05-27 ASSESSMENT — PATIENT HEALTH QUESTIONNAIRE - PHQ9
SUM OF ALL RESPONSES TO PHQ9 QUESTIONS 1 & 2: 0
SUM OF ALL RESPONSES TO PHQ QUESTIONS 1-9: 0
1. LITTLE INTEREST OR PLEASURE IN DOING THINGS: 0
SUM OF ALL RESPONSES TO PHQ QUESTIONS 1-9: 0

## 2021-05-27 NOTE — PROGRESS NOTES
2021    Sola Chavez (:  1930) is a 80 y.o. male, here for evaluation of the following medical concerns: Dizziness. 71-year-old male presents with a complaint of dizziness. Assessment for driving capability: The patient recently was reported missing after getting lost on the highway. His license has been held until he has a further assessment regarding this matter. Dizziness: The patient reports dizziness upon standing. Previously the patient's Flomax was discontinued due to this complaint. He additionally has a history of a gastrointestinal bleed. He denies additional neurologic or cardiopulmonary symptoms. This complaint was not addressed today. Diverticular bleeding and hemorroids:    No additional bleeding has been observed since being discharged from the hospital.        Hypothyroidism:  The patient has not been taking his Synthroid 125 mcg daily. The patients thyroid studies are noted below. Component      Latest Ref Rng & Units 2020           4:34 PM  4:34 PM   TSH      0.440 - 3.860 uIU/mL  133.800 (H)   T4 Free      0.84 - 1.68 ng/dL 0.26 (L)    . Benign prostatic hypertrophy :  The patient was previously prescribed Proscar however this medication is being held due to his complaint of dizziness his symptoms of polyuria have improved. Insomnia: The patient reports that his insomnia symptoms have begun to improve. Effective which include melatonin, and other over-the-counter sleep aids. He has been responsive to Restoril in the past.    At present he denies,  Polydipsia, constitutional, sinus, visual, cardiopulmonary, urologic, gastrointestinal, immunologic/hematologic, musculoskeletal, neurologic,dermatologic, or psychiatric complaints. Review of Systems   Constitutional: Negative for chills, diaphoresis, fatigue and fever.    HENT: Negative for congestion, dental problem, drooling, ear discharge, ear pain, facial swelling, hearing loss, mouth sores, nosebleeds, postnasal drip, rhinorrhea, sinus pressure, sinus pain, sneezing, sore throat, tinnitus, trouble swallowing and voice change. Eyes: Negative for photophobia, pain, discharge, redness, itching and visual disturbance. Respiratory: Negative for apnea, cough, chest tightness, shortness of breath and wheezing. Cardiovascular: Negative for chest pain, palpitations and leg swelling. Gastrointestinal: Negative for abdominal distention, abdominal pain, blood in stool, constipation, diarrhea, nausea, rectal pain and vomiting. Endocrine: Negative for cold intolerance, heat intolerance, polydipsia, polyphagia and polyuria. Genitourinary: Negative for decreased urine volume, difficulty urinating, dysuria, flank pain, frequency, genital sores, hematuria and urgency. Musculoskeletal: Negative for arthralgias, back pain, gait problem, joint swelling, myalgias, neck pain and neck stiffness. Skin: Negative for color change, rash and wound. Allergic/Immunologic: Negative for environmental allergies and food allergies. Neurological: Negative for dizziness, tremors, seizures, syncope, facial asymmetry, speech difficulty, weakness, light-headedness, numbness and headaches. Hematological: Negative for adenopathy. Does not bruise/bleed easily. Psychiatric/Behavioral: Negative for agitation, confusion, decreased concentration, hallucinations, self-injury, sleep disturbance and suicidal ideas. The patient is not nervous/anxious. Prior to Visit Medications    Medication Sig Taking? Authorizing Provider   levothyroxine (SYNTHROID) 112 MCG tablet Take 1 tablet by mouth Daily  Sarita Monteiro MD   ferrous sulfate (IRON 325) 325 (65 Fe) MG tablet Take 1 tablet by mouth 2 times daily  Sarita Monteiro MD        Allergies   Allergen Reactions    Baclofen        No past medical history on file.     Past Surgical History:   Procedure Laterality Date    COLONOSCOPY N/A 5/1/2020 COLONOSCOPY ROOM 175 performed by Michelle Isaacs MD at [facility]         Social History     Socioeconomic History    Marital status:      Spouse name: Not on file    Number of children: Not on file    Years of education: Not on file    Highest education level: Not on file   Occupational History    Not on file   Tobacco Use    Smoking status: Never Smoker    Smokeless tobacco: Never Used   Substance and Sexual Activity    Alcohol use: Never    Drug use: Not on file    Sexual activity: Not on file   Other Topics Concern    Not on file   Social History Narrative    Not on file     Social Determinants of Health     Financial Resource Strain: Low Risk     Difficulty of Paying Living Expenses: Not very hard   Food Insecurity: No Food Insecurity    Worried About Running Out of Food in the Last Year: Never true    Elvia of Food in the Last Year: Never true   Transportation Needs:     Lack of Transportation (Medical):  Lack of Transportation (Non-Medical):    Physical Activity:     Days of Exercise per Week:     Minutes of Exercise per Session:    Stress:     Feeling of Stress :    Social Connections:     Frequency of Communication with Friends and Family:     Frequency of Social Gatherings with Friends and Family:     Attends Jew Services:     Active Member of Clubs or Organizations:     Attends Club or Organization Meetings:     Marital Status:    Intimate Partner Violence:     Fear of Current or Ex-Partner:     Emotionally Abused:     Physically Abused:     Sexually Abused:         No family history on file. Vitals:    05/27/21 0902   BP: 138/80   Pulse: 59   Resp: 14   Temp: 97 °F (36.1 °C)   SpO2: 97%   Weight: 235 lb (106.6 kg)   Height: 6' (1.829 m)     Estimated body mass index is 31.87 kg/m² as calculated from the following:    Height as of this encounter: 6' (1.829 m). Weight as of this encounter: 235 lb (106.6 kg).   Physical Exam  Constitutional:       General: He is not in acute distress. Appearance: He is well-developed. HENT:      Head: Normocephalic. Right Ear: External ear normal.      Left Ear: External ear normal.   Eyes:      Conjunctiva/sclera: Conjunctivae normal.   Neck:      Vascular: No JVD. Trachea: No tracheal deviation. Cardiovascular:      Rate and Rhythm: Normal rate and regular rhythm. Heart sounds: Normal heart sounds. Pulmonary:      Effort: Pulmonary effort is normal. No respiratory distress. Breath sounds: Normal breath sounds. No wheezing or rales. Chest:      Chest wall: No tenderness. Abdominal:      General: Bowel sounds are normal. There is no distension. Palpations: Abdomen is soft. There is no mass. Tenderness: There is no abdominal tenderness. There is no guarding or rebound. Musculoskeletal:         General: No tenderness or deformity. Cervical back: Neck supple. Skin:     General: Skin is warm and dry. Coloration: Skin is not pale. Findings: No erythema or rash. Neurological:      Mental Status: He is alert and oriented to person, place, and time. Motor: No abnormal muscle tone. Psychiatric:         Thought Content: Thought content normal.         Judgment: Judgment normal.             ASSESSMENT/PLAN:  Driving safety issue: The patient appears to be clinically suitable for driving. I have filled out forms stating that he may drive. I have instructed him to only drive during the day. Dizziness: Blood pressure is normal at this time. Will check complete blood cell count and a basic metabolic panel to exclude anemia and hyponatremia as a cause of his dizziness. Insomnia- Continue Ambien. Oars report reviewed. In the past I have had a great reservations about utilizing benzodiazepines to help the patient to achieve sleep.   Since he has attempted to utilize numerous over-the-counter medications without success and is

## 2021-07-07 ENCOUNTER — OFFICE VISIT (OUTPATIENT)
Dept: FAMILY MEDICINE CLINIC | Age: 86
End: 2021-07-07

## 2021-07-07 VITALS
HEIGHT: 72 IN | WEIGHT: 227 LBS | BODY MASS INDEX: 30.75 KG/M2 | OXYGEN SATURATION: 97 % | HEART RATE: 54 BPM | DIASTOLIC BLOOD PRESSURE: 62 MMHG | SYSTOLIC BLOOD PRESSURE: 128 MMHG | RESPIRATION RATE: 14 BRPM

## 2021-07-07 DIAGNOSIS — Z91.89 DRIVING SAFETY ISSUE: Primary | ICD-10-CM

## 2021-07-07 PROCEDURE — 99213 OFFICE O/P EST LOW 20 MIN: CPT | Performed by: INTERNAL MEDICINE

## 2021-07-07 ASSESSMENT — ENCOUNTER SYMPTOMS
WHEEZING: 0
RHINORRHEA: 0
EYE REDNESS: 0
SINUS PAIN: 0
COUGH: 0
EYE ITCHING: 0
SORE THROAT: 0
BACK PAIN: 0
CONSTIPATION: 0
SHORTNESS OF BREATH: 0
DIARRHEA: 0
ABDOMINAL PAIN: 0
RECTAL PAIN: 0
FACIAL SWELLING: 0
COLOR CHANGE: 0
CHEST TIGHTNESS: 0
EYE DISCHARGE: 0
NAUSEA: 0
VOMITING: 0
PHOTOPHOBIA: 0
APNEA: 0
SINUS PRESSURE: 0
EYE PAIN: 0
TROUBLE SWALLOWING: 0
BLOOD IN STOOL: 0
ABDOMINAL DISTENTION: 0
VOICE CHANGE: 0

## 2021-07-07 NOTE — PROGRESS NOTES
2021    Deisy Myers (:  1930) is a 80 y.o. male, here for evaluation of the following medical concerns: Dizziness. 80-year-old male presents with a complaint of dizziness. Assessment for driving capability: The patient recently was reported missing after getting lost on the highway. His license has been held until he has a further assessment regarding this matter. Paperwork was filled out allowing the patient to resume his driving. It was mild however [facility] did not receive the correspondence. Dizziness: The patient reports dizziness upon standing. Previously the patient's Flomax was discontinued due to this complaint. He additionally has a history of a gastrointestinal bleed. He denies additional neurologic or cardiopulmonary symptoms. This complaint was not addressed today. Diverticular bleeding and hemorroids:    No additional bleeding has been observed since being discharged from the hospital.        Hypothyroidism:  The patient has not been taking his Synthroid 125 mcg daily. The patients thyroid studies are noted below. Component      Latest Ref Rng & Units 2020           4:34 PM  4:34 PM   TSH      0.440 - 3.860 uIU/mL  133.800 (H)   T4 Free      0.84 - 1.68 ng/dL 0.26 (L)    . Benign prostatic hypertrophy :  The patient was previously prescribed Proscar however this medication is being held due to his complaint of dizziness his symptoms of polyuria have improved. Insomnia: The patient reports that his insomnia symptoms have begun to improve. Effective which include melatonin, and other over-the-counter sleep aids. He has been responsive to Restoril in the past.    At present he denies,  Polydipsia, constitutional, sinus, visual, cardiopulmonary, urologic, gastrointestinal, immunologic/hematologic, musculoskeletal, neurologic,dermatologic, or psychiatric complaints.       Review of Systems   Constitutional: Negative for chills, diaphoresis, fatigue and fever. HENT: Negative for congestion, dental problem, drooling, ear discharge, ear pain, facial swelling, hearing loss, mouth sores, nosebleeds, postnasal drip, rhinorrhea, sinus pressure, sinus pain, sneezing, sore throat, tinnitus, trouble swallowing and voice change. Eyes: Negative for photophobia, pain, discharge, redness, itching and visual disturbance. Respiratory: Negative for apnea, cough, chest tightness, shortness of breath and wheezing. Cardiovascular: Negative for chest pain, palpitations and leg swelling. Gastrointestinal: Negative for abdominal distention, abdominal pain, blood in stool, constipation, diarrhea, nausea, rectal pain and vomiting. Endocrine: Negative for cold intolerance, heat intolerance, polydipsia, polyphagia and polyuria. Genitourinary: Negative for decreased urine volume, difficulty urinating, dysuria, flank pain, frequency, genital sores, hematuria and urgency. Musculoskeletal: Negative for arthralgias, back pain, gait problem, joint swelling, myalgias, neck pain and neck stiffness. Skin: Negative for color change, rash and wound. Allergic/Immunologic: Negative for environmental allergies and food allergies. Neurological: Negative for dizziness, tremors, seizures, syncope, facial asymmetry, speech difficulty, weakness, light-headedness, numbness and headaches. Hematological: Negative for adenopathy. Does not bruise/bleed easily. Psychiatric/Behavioral: Negative for agitation, confusion, decreased concentration, hallucinations, self-injury, sleep disturbance and suicidal ideas. The patient is not nervous/anxious. Prior to Visit Medications    Medication Sig Taking?  Authorizing Provider   ferrous sulfate (IRON 325) 325 (65 Fe) MG tablet Take 1 tablet by mouth 2 times daily Yes Kristan Morales MD   levothyroxine (SYNTHROID) 112 MCG tablet Take 1 tablet by mouth Daily Yes Kristan Morales MD        Allergies   Allergen Reactions   Baldemar Hart Baclofen        No past medical history on file. Past Surgical History:   Procedure Laterality Date    COLONOSCOPY N/A 5/1/2020    COLONOSCOPY ROOM 175 performed by Franky Scales MD at [facility]         Social History     Socioeconomic History    Marital status:      Spouse name: Not on file    Number of children: Not on file    Years of education: Not on file    Highest education level: Not on file   Occupational History    Not on file   Tobacco Use    Smoking status: Never Smoker    Smokeless tobacco: Never Used   Substance and Sexual Activity    Alcohol use: Never    Drug use: Not on file    Sexual activity: Not on file   Other Topics Concern    Not on file   Social History Narrative    Not on file     Social Determinants of Health     Financial Resource Strain: Low Risk     Difficulty of Paying Living Expenses: Not very hard   Food Insecurity: No Food Insecurity    Worried About Running Out of Food in the Last Year: Never true    Elvia of Food in the Last Year: Never true   Transportation Needs:     Lack of Transportation (Medical):  Lack of Transportation (Non-Medical):    Physical Activity:     Days of Exercise per Week:     Minutes of Exercise per Session:    Stress:     Feeling of Stress :    Social Connections:     Frequency of Communication with Friends and Family:     Frequency of Social Gatherings with Friends and Family:     Attends Uatsdin Services:     Active Member of Clubs or Organizations:     Attends Club or Organization Meetings:     Marital Status:    Intimate Partner Violence:     Fear of Current or Ex-Partner:     Emotionally Abused:     Physically Abused:     Sexually Abused:         No family history on file.     Vitals:    07/07/21 1058   BP: 128/62   Pulse: 54   Resp: 14   SpO2: 97%   Weight: 227 lb (103 kg)   Height: 6' (1.829 m)     Estimated body mass index is 30.79 kg/m² as calculated from the following: Height as of this encounter: 6' (1.829 m). Weight as of this encounter: 227 lb (103 kg). Physical Exam  Constitutional:       General: He is not in acute distress. Appearance: He is well-developed. HENT:      Head: Normocephalic. Right Ear: External ear normal.      Left Ear: External ear normal.   Eyes:      Conjunctiva/sclera: Conjunctivae normal.   Neck:      Vascular: No JVD. Trachea: No tracheal deviation. Cardiovascular:      Rate and Rhythm: Normal rate and regular rhythm. Heart sounds: Normal heart sounds. Pulmonary:      Effort: Pulmonary effort is normal. No respiratory distress. Breath sounds: Normal breath sounds. No wheezing or rales. Chest:      Chest wall: No tenderness. Abdominal:      General: Bowel sounds are normal. There is no distension. Palpations: Abdomen is soft. There is no mass. Tenderness: There is no abdominal tenderness. There is no guarding or rebound. Musculoskeletal:         General: No tenderness or deformity. Cervical back: Neck supple. Skin:     General: Skin is warm and dry. Coloration: Skin is not pale. Findings: No erythema or rash. Neurological:      Mental Status: He is alert and oriented to person, place, and time. Motor: No abnormal muscle tone. Psychiatric:         Thought Content: Thought content normal.         Judgment: Judgment normal.             ASSESSMENT/PLAN:  Driving safety issue: The patient appears to be clinically suitable for driving. Again refilled out forms for the patient to resume his driving. Provided the patient with the paperwork I have instructed him to only drive during the day. Dizziness: Blood pressure is normal at this time. Will check complete blood cell count and a basic metabolic panel to exclude anemia and hyponatremia as a cause of his dizziness. Insomnia- Continue Ambien. Oars report reviewed.   In the past I have had a great reservations about

## 2021-10-07 ENCOUNTER — OFFICE VISIT (OUTPATIENT)
Dept: FAMILY MEDICINE CLINIC | Age: 86
End: 2021-10-07
Payer: MEDICARE

## 2021-10-07 VITALS
OXYGEN SATURATION: 97 % | SYSTOLIC BLOOD PRESSURE: 138 MMHG | HEIGHT: 72 IN | BODY MASS INDEX: 30.48 KG/M2 | HEART RATE: 71 BPM | DIASTOLIC BLOOD PRESSURE: 68 MMHG | RESPIRATION RATE: 14 BRPM | WEIGHT: 225 LBS

## 2021-10-07 DIAGNOSIS — R82.998: ICD-10-CM

## 2021-10-07 DIAGNOSIS — D64.9 ANEMIA, UNSPECIFIED TYPE: Primary | ICD-10-CM

## 2021-10-07 DIAGNOSIS — E03.9 HYPOTHYROIDISM, UNSPECIFIED TYPE: ICD-10-CM

## 2021-10-07 PROCEDURE — 99214 OFFICE O/P EST MOD 30 MIN: CPT | Performed by: INTERNAL MEDICINE

## 2021-10-07 RX ORDER — LATANOPROST 50 UG/ML
SOLUTION/ DROPS OPHTHALMIC
COMMUNITY
Start: 2021-09-08

## 2021-10-07 ASSESSMENT — ENCOUNTER SYMPTOMS
ABDOMINAL DISTENTION: 0
PHOTOPHOBIA: 0
WHEEZING: 0
CHEST TIGHTNESS: 0
COLOR CHANGE: 0
SINUS PAIN: 0
SINUS PRESSURE: 0
SORE THROAT: 0
DIARRHEA: 0
SHORTNESS OF BREATH: 0
ABDOMINAL PAIN: 0
FACIAL SWELLING: 0
EYE PAIN: 0
NAUSEA: 0
VOICE CHANGE: 0
APNEA: 0
TROUBLE SWALLOWING: 0
EYE REDNESS: 0
RECTAL PAIN: 0
RHINORRHEA: 0
BLOOD IN STOOL: 0
CONSTIPATION: 0
BACK PAIN: 0
EYE DISCHARGE: 0
VOMITING: 0
EYE ITCHING: 0
COUGH: 0

## 2021-10-07 NOTE — PROGRESS NOTES
10/7/2021    Tim Patel (:  1930) is a 80 y.o. male, here for evaluation of the following medical concerns: Dizziness. 25-year-old male presents with a complaint of dizziness. Assessment for driving capability: The patient recently was reported missing after getting lost on the highway. His license was held until was assessed further regarding this matter. Paperwork was filled out allowing the patient to resume his driving. The patient is driving and has not had additional problems since his prior visit. Dizziness: The patient reports dizziness upon standing. Previously the patient's Flomax was discontinued due to this complaint. He additionally has a history of a gastrointestinal bleed. He denies additional neurologic or cardiopulmonary symptoms. This complaint was not addressed today. Diverticular bleeding and hemorroids:    No additional bleeding has been observed since being discharged from the hospital.        Hypothyroidism:  The patient is compliant with Synthroid 125 mcg daily. The patients thyroid studies are noted below. Component      Latest Ref Rng & Units 2020           4:34 PM  4:34 PM   TSH      0.440 - 3.860 uIU/mL  133.800 (H)   T4 Free      0.84 - 1.68 ng/dL 0.26 (L)    . Benign prostatic hypertrophy :  The patient was previously prescribed Proscar however this medication is being held due to his complaint of dizziness his symptoms of polyuria have improved. Insomnia: The patient reports that his insomnia symptoms have begun to improve. Effective which include melatonin, and other over-the-counter sleep aids. He has been responsive to Restoril in the past.        At present he denies,  Polydipsia, constitutional, sinus, visual, cardiopulmonary, urologic, gastrointestinal, immunologic/hematologic, musculoskeletal, neurologic,dermatologic, or psychiatric complaints.       Review of Systems   Constitutional: Negative for chills, diaphoresis, fatigue and fever. HENT: Negative for congestion, dental problem, drooling, ear discharge, ear pain, facial swelling, hearing loss, mouth sores, nosebleeds, postnasal drip, rhinorrhea, sinus pressure, sinus pain, sneezing, sore throat, tinnitus, trouble swallowing and voice change. Eyes: Negative for photophobia, pain, discharge, redness, itching and visual disturbance. Respiratory: Negative for apnea, cough, chest tightness, shortness of breath and wheezing. Cardiovascular: Negative for chest pain, palpitations and leg swelling. Gastrointestinal: Negative for abdominal distention, abdominal pain, blood in stool, constipation, diarrhea, nausea, rectal pain and vomiting. Endocrine: Negative for cold intolerance, heat intolerance, polydipsia, polyphagia and polyuria. Genitourinary: Negative for decreased urine volume, difficulty urinating, dysuria, flank pain, frequency, genital sores, hematuria and urgency. Musculoskeletal: Negative for arthralgias, back pain, gait problem, joint swelling, myalgias, neck pain and neck stiffness. Skin: Negative for color change, rash and wound. Allergic/Immunologic: Negative for environmental allergies and food allergies. Neurological: Negative for dizziness, tremors, seizures, syncope, facial asymmetry, speech difficulty, weakness, light-headedness, numbness and headaches. Hematological: Negative for adenopathy. Does not bruise/bleed easily. Psychiatric/Behavioral: Negative for agitation, confusion, decreased concentration, hallucinations, self-injury, sleep disturbance and suicidal ideas. The patient is not nervous/anxious. Prior to Visit Medications    Medication Sig Taking?  Authorizing Provider   latanoprost (XALATAN) 0.005 % ophthalmic solution  Yes Historical Provider, MD   ferrous sulfate (IRON 325) 325 (65 Fe) MG tablet Take 1 tablet by mouth 2 times daily Yes Pollo Coronado MD   levothyroxine (SYNTHROID) 112 MCG Height: 6' (1.829 m)     Estimated body mass index is 30.52 kg/m² as calculated from the following:    Height as of this encounter: 6' (1.829 m). Weight as of this encounter: 225 lb (102.1 kg). Physical Exam  Constitutional:       General: He is not in acute distress. Appearance: He is well-developed. HENT:      Head: Normocephalic. Right Ear: External ear normal.      Left Ear: External ear normal.   Eyes:      Conjunctiva/sclera: Conjunctivae normal.   Neck:      Vascular: No JVD. Trachea: No tracheal deviation. Cardiovascular:      Rate and Rhythm: Normal rate and regular rhythm. Heart sounds: Normal heart sounds. Pulmonary:      Effort: Pulmonary effort is normal. No respiratory distress. Breath sounds: Normal breath sounds. No wheezing or rales. Chest:      Chest wall: No tenderness. Abdominal:      General: Bowel sounds are normal. There is no distension. Palpations: Abdomen is soft. There is no mass. Tenderness: There is no abdominal tenderness. There is no guarding or rebound. Musculoskeletal:         General: No tenderness or deformity. Cervical back: Neck supple. Skin:     General: Skin is warm and dry. Coloration: Skin is not pale. Findings: No erythema or rash. Neurological:      Mental Status: He is alert and oriented to person, place, and time. Motor: No abnormal muscle tone. Psychiatric:         Thought Content: Thought content normal.         Judgment: Judgment normal.             ASSESSMENT/PLAN:  Driving safety issue: The patient appears to be clinically suitable for driving. Again refilled out forms for the patient to resume his driving. Dizziness: Blood pressure is normal at this time. Will check complete blood cell count and a basic metabolic panel to exclude anemia and hyponatremia as a cause of his dizziness. Insomnia- Continue Ambien. Oars report reviewed.   In the past I have had a great reservations about utilizing benzodiazepines to help the patient to achieve sleep. Since he has attempted to utilize numerous over-the-counter medications without success and is willing to implement lifestyle changes I will consider temporary use of Restoril to help the patient achieve sleep short-term. Diverticulosis-continue to promote intake of fiber, monitoring H/H. continue holding aspirin. Acute Anemia-stable, monitoring        Hypothyroidism, unspecified type  -Encouraged compliance with Synthroid. Repeat thyroid level in 3 months. Benign prostatic hyperplasia without lower urinary tract symptoms-resume Proscar      Ruth Hamilton MD   --Ruth Hamilton MD on 10/7/2021 at 1:38 PM    An electronic signature was used to authenticate this note    Return in about 7 weeks (around 11/25/2021). An  electronic signature was used to authenticate this note.

## 2021-12-10 ENCOUNTER — OFFICE VISIT (OUTPATIENT)
Dept: FAMILY MEDICINE CLINIC | Age: 86
End: 2021-12-10
Payer: MEDICARE

## 2021-12-10 VITALS
HEIGHT: 72 IN | RESPIRATION RATE: 14 BRPM | BODY MASS INDEX: 29.53 KG/M2 | SYSTOLIC BLOOD PRESSURE: 130 MMHG | DIASTOLIC BLOOD PRESSURE: 72 MMHG | WEIGHT: 218 LBS | OXYGEN SATURATION: 98 % | HEART RATE: 59 BPM

## 2021-12-10 DIAGNOSIS — E03.9 HYPOTHYROIDISM, UNSPECIFIED TYPE: Primary | ICD-10-CM

## 2021-12-10 DIAGNOSIS — D64.9 ANEMIA, UNSPECIFIED TYPE: ICD-10-CM

## 2021-12-10 DIAGNOSIS — E03.9 HYPOTHYROIDISM, UNSPECIFIED TYPE: ICD-10-CM

## 2021-12-10 LAB
BASOPHILS ABSOLUTE: 0 K/UL (ref 0–0.2)
BASOPHILS RELATIVE PERCENT: 0.5 %
EOSINOPHILS ABSOLUTE: 0 K/UL (ref 0–0.7)
EOSINOPHILS RELATIVE PERCENT: 0.5 %
HCT VFR BLD CALC: 44.6 % (ref 42–52)
HEMOGLOBIN: 14.6 G/DL (ref 14–18)
LYMPHOCYTES ABSOLUTE: 1.4 K/UL (ref 1–4.8)
LYMPHOCYTES RELATIVE PERCENT: 29.2 %
MCH RBC QN AUTO: 32.5 PG (ref 27–31.3)
MCHC RBC AUTO-ENTMCNC: 32.8 % (ref 33–37)
MCV RBC AUTO: 99 FL (ref 80–100)
MONOCYTES ABSOLUTE: 0.3 K/UL (ref 0.2–0.8)
MONOCYTES RELATIVE PERCENT: 7.1 %
NEUTROPHILS ABSOLUTE: 3.1 K/UL (ref 1.4–6.5)
NEUTROPHILS RELATIVE PERCENT: 62.7 %
PDW BLD-RTO: 15.7 % (ref 11.5–14.5)
PLATELET # BLD: 205 K/UL (ref 130–400)
RBC # BLD: 4.5 M/UL (ref 4.7–6.1)
T4 FREE: 0.93 NG/DL (ref 0.84–1.68)
WBC # BLD: 4.9 K/UL (ref 4.8–10.8)

## 2021-12-10 PROCEDURE — 99214 OFFICE O/P EST MOD 30 MIN: CPT | Performed by: INTERNAL MEDICINE

## 2021-12-10 RX ORDER — PHENOL 1.4 %
AEROSOL, SPRAY (ML) MUCOUS MEMBRANE
Qty: 30 TABLET | Refills: 0 | Status: SHIPPED | OUTPATIENT
Start: 2021-12-10 | End: 2022-03-18 | Stop reason: SDUPTHER

## 2021-12-10 ASSESSMENT — ENCOUNTER SYMPTOMS
COLOR CHANGE: 0
RHINORRHEA: 0
EYE REDNESS: 0
SINUS PAIN: 0
BACK PAIN: 0
NAUSEA: 0
FACIAL SWELLING: 0
TROUBLE SWALLOWING: 0
SORE THROAT: 0
EYE DISCHARGE: 0
VOMITING: 0
CONSTIPATION: 0
RECTAL PAIN: 0
EYE ITCHING: 0
APNEA: 0
SINUS PRESSURE: 0
ABDOMINAL PAIN: 0
EYE PAIN: 0
COUGH: 0
CHEST TIGHTNESS: 0
BLOOD IN STOOL: 0
DIARRHEA: 0
SHORTNESS OF BREATH: 0
ABDOMINAL DISTENTION: 0
PHOTOPHOBIA: 0
WHEEZING: 0
VOICE CHANGE: 0

## 2021-12-10 NOTE — PROGRESS NOTES
12/10/2021    Lashay Vance (:  1930) is a 80 y.o. male, here for evaluation of the following medical concerns: Hypothyroidism, diverticular bleeding hemorrhoids, iron deficiency anemia and insomnia      Dizziness: Stable at this time. Diverticular bleeding and hemorroids: The patient states that he is experiencing melanotic stools. Hypothyroidism:  The patient is compliant with Synthroid 125 mcg daily. The patients thyroid studies are noted below. Component      Latest Ref Rng & Units 2020           4:34 PM  4:34 PM   TSH      0.440 - 3.860 uIU/mL  133.800 (H)   T4 Free      0.84 - 1.68 ng/dL 0.26 (L)    . Benign prostatic hypertrophy :  The patient was previously prescribed Proscar however this medication was  held due to his complaint of dizziness his symptoms of polyuria have improved. Insomnia: The patient reports that his insomnia has returned. Assessment for driving capability: The patient recently was reported missing after getting lost on the highway. His license was held until was assessed further regarding this matter. Paperwork was filled out allowing the patient to resume his driving. The patient is driving and has not had additional problems since his prior visit. At present he denies,  Polydipsia, constitutional, sinus, visual, cardiopulmonary, urologic, gastrointestinal, immunologic/hematologic, musculoskeletal, neurologic,dermatologic, or psychiatric complaints. Review of Systems   Constitutional: Negative for chills, diaphoresis, fatigue and fever. HENT: Negative for congestion, dental problem, drooling, ear discharge, ear pain, facial swelling, hearing loss, mouth sores, nosebleeds, postnasal drip, rhinorrhea, sinus pressure, sinus pain, sneezing, sore throat, tinnitus, trouble swallowing and voice change. Eyes: Negative for photophobia, pain, discharge, redness, itching and visual disturbance.    Respiratory: Not on file    Number of children: Not on file    Years of education: Not on file    Highest education level: Not on file   Occupational History    Not on file   Tobacco Use    Smoking status: Never Smoker    Smokeless tobacco: Never Used   Substance and Sexual Activity    Alcohol use: Never    Drug use: Not on file    Sexual activity: Not on file   Other Topics Concern    Not on file   Social History Narrative    Not on file     Social Determinants of Health     Financial Resource Strain: Low Risk     Difficulty of Paying Living Expenses: Not very hard   Food Insecurity: No Food Insecurity    Worried About Running Out of Food in the Last Year: Never true    Elvia of Food in the Last Year: Never true   Transportation Needs:     Lack of Transportation (Medical): Not on file    Lack of Transportation (Non-Medical): Not on file   Physical Activity:     Days of Exercise per Week: Not on file    Minutes of Exercise per Session: Not on file   Stress:     Feeling of Stress : Not on file   Social Connections:     Frequency of Communication with Friends and Family: Not on file    Frequency of Social Gatherings with Friends and Family: Not on file    Attends Druze Services: Not on file    Active Member of [address] [place] [address] [employer] or Organizations: Not on file    Attends Club or Organization Meetings: Not on file    Marital Status: Not on file   Intimate Partner Violence:     Fear of Current or Ex-Partner: Not on file    Emotionally Abused: Not on file    Physically Abused: Not on file    Sexually Abused: Not on file   Housing Stability:     Unable to Pay for Housing in the Last Year: Not on file    Number of [place] in the Last Year: Not on file    Unstable Housing in the Last Year: Not on file        No family history on file.     Vitals:    12/10/21 1052   BP: 130/72   Pulse: 59   Resp: 14   SpO2: 98%   Weight: 218 lb (98.9 kg)   Height: 6' (1.829 m)     Estimated body mass index is 29.57 kg/m² as calculated from the following:    Height as of this encounter: 6' (1.829 m). Weight as of this encounter: 218 lb (98.9 kg). Physical Exam  Constitutional:       General: He is not in acute distress. Appearance: He is well-developed. HENT:      Head: Normocephalic. Right Ear: External ear normal.      Left Ear: External ear normal.   Eyes:      Conjunctiva/sclera: Conjunctivae normal.   Neck:      Vascular: No JVD. Trachea: No tracheal deviation. Cardiovascular:      Rate and Rhythm: Normal rate and regular rhythm. Heart sounds: Normal heart sounds. Pulmonary:      Effort: Pulmonary effort is normal. No respiratory distress. Breath sounds: Normal breath sounds. No wheezing or rales. Chest:      Chest wall: No tenderness. Abdominal:      General: Bowel sounds are normal. There is no distension. Palpations: Abdomen is soft. There is no mass. Tenderness: There is no abdominal tenderness. There is no guarding or rebound. Musculoskeletal:         General: No tenderness or deformity. Cervical back: Neck supple. Skin:     General: Skin is warm and dry. Coloration: Skin is not pale. Findings: No erythema or rash. Neurological:      Mental Status: He is alert and oriented to person, place, and time. Motor: No abnormal muscle tone. Psychiatric:         Thought Content: Thought content normal.         Judgment: Judgment normal.             ASSESSMENT/PLAN:                    Insomnia- Continue Ambien. Oars report reviewed. In the past I have had a great reservations about utilizing benzodiazepines to help the patient to achieve sleep. Since he has attempted to utilize numerous over-the-counter medications without success and is willing to implement lifestyle changes I will consider temporary use of Restoril to help the patient achieve sleep short-term.           Diverticulosis-continue to promote intake of fiber, monitoring H/H. continue holding aspirin. Acute Anemia-obtain a complete blood cell count today. Continue oral iron          Hypothyroidism, unspecified type  -Encouraged compliance with Synthroid. Assess thyroid function today          Benign prostatic hyperplasia without lower urinary tract symptoms-resume Proscar            Driving safety issue: The patient appears to be clinically suitable for driving. Again refilled out forms for the patient to resume his driving. Dizziness: Blood pressure is normal at this time. Will check complete blood cell count and a basic metabolic panel to exclude anemia and hyponatremia as a cause of his dizziness. Chen Taylor MD   --Chen Taylor MD on 12/10/2021 at 11:11 AM    An electronic signature was used to authenticate this note    No follow-ups on file. An  electronic signature was used to authenticate this note.

## 2022-01-03 NOTE — TELEPHONE ENCOUNTER
[address] [facility] Clinical Staff  Subject: Refill Request     QUESTIONS   Name of Medication? levothyroxine (SYNTHROID) 112 MCG tablet   Patient-reported dosage and instructions? 112mcg 1 tablet by mouth daily   How many days do you have left? 0   Preferred Pharmacy? [id number] phone number (if available)? [phone]   Additional Information for Provider? patient's wife says the script    on , and  needs a refill.   ---------------------------------------------------------------------------   --------------   CALL BACK INFO   What is the best way for the office to contact you? OK to leave message on   voicemail   Preferred Call Back Phone Number?  [phone]

## 2022-01-04 RX ORDER — LEVOTHYROXINE SODIUM 112 UG/1
112 TABLET ORAL DAILY
Qty: 90 TABLET | Refills: 3 | Status: SHIPPED | OUTPATIENT
Start: 2022-01-04 | End: 2022-03-18 | Stop reason: SDUPTHER

## 2022-01-31 ENCOUNTER — TELEPHONE (OUTPATIENT)
Dept: FAMILY MEDICINE CLINIC | Age: 87
End: 2022-01-31

## 2022-01-31 NOTE — TELEPHONE ENCOUNTER
Patient's daughter John Diaz calling. She states that patient is seeing you on Friday, new patient. She states she is POA, and will be bringing the documentation to the appointment. She just wanted to let you know that she believes patient is having signs of dementia. She states his previous pcp never made a recommendation for neurology, and thinks he needs to see someone. He went missing 4 times in the last year, he still drives, but she doesn't think he should. She wants to try to discuss further with you privately at his appointment, but wanted to give you a heads up of what is going on.

## 2022-03-18 ENCOUNTER — HOSPITAL ENCOUNTER (OUTPATIENT)
Dept: LAB | Age: 87
Discharge: HOME | End: 2022-03-18
Payer: MEDICARE

## 2022-03-18 ENCOUNTER — OFFICE VISIT (OUTPATIENT)
Dept: FAMILY MEDICINE CLINIC | Age: 87
End: 2022-03-18
Payer: MEDICARE

## 2022-03-18 VITALS
HEART RATE: 63 BPM | BODY MASS INDEX: 29.89 KG/M2 | SYSTOLIC BLOOD PRESSURE: 130 MMHG | DIASTOLIC BLOOD PRESSURE: 70 MMHG | OXYGEN SATURATION: 99 % | WEIGHT: 220.4 LBS | TEMPERATURE: 97.7 F

## 2022-03-18 DIAGNOSIS — R41.3 MEMORY DEFICIT: Primary | ICD-10-CM

## 2022-03-18 DIAGNOSIS — E03.9 HYPOTHYROIDISM, UNSPECIFIED TYPE: ICD-10-CM

## 2022-03-18 DIAGNOSIS — N18.30 STAGE 3 CHRONIC KIDNEY DISEASE, UNSPECIFIED WHETHER STAGE 3A OR 3B CKD (HCC): ICD-10-CM

## 2022-03-18 DIAGNOSIS — I50.9 CHRONIC CONGESTIVE HEART FAILURE, UNSPECIFIED HEART FAILURE TYPE (HCC): ICD-10-CM

## 2022-03-18 DIAGNOSIS — G47.00 INSOMNIA, UNSPECIFIED TYPE: ICD-10-CM

## 2022-03-18 DIAGNOSIS — R41.3 MEMORY DEFICIT: ICD-10-CM

## 2022-03-18 DIAGNOSIS — D64.9 ANEMIA, UNSPECIFIED TYPE: ICD-10-CM

## 2022-03-18 PROBLEM — N40.0 BENIGN PROSTATIC HYPERPLASIA: Status: ACTIVE | Noted: 2022-03-18

## 2022-03-18 LAB
ANION GAP SERPL CALCULATED.3IONS-SCNC: 17 MEQ/L (ref 9–15)
BASOPHILS ABSOLUTE: 0 K/UL (ref 0–0.2)
BASOPHILS RELATIVE PERCENT: 0.4 %
BUN BLDV-MCNC: 21 MG/DL (ref 8–23)
CALCIUM SERPL-MCNC: 9.3 MG/DL (ref 8.5–9.9)
CHLORIDE BLD-SCNC: 105 MEQ/L (ref 95–107)
CO2: 18 MEQ/L (ref 20–31)
CREAT SERPL-MCNC: 1.71 MG/DL (ref 0.7–1.2)
EOSINOPHILS ABSOLUTE: 0 K/UL (ref 0–0.7)
EOSINOPHILS RELATIVE PERCENT: 0.3 %
GFR AFRICAN AMERICAN: 45.5
GFR NON-AFRICAN AMERICAN: 37.6
GLUCOSE BLD-MCNC: 106 MG/DL (ref 70–99)
HCT VFR BLD CALC: 42.2 % (ref 42–52)
HEMOGLOBIN: 13.9 G/DL (ref 14–18)
LYMPHOCYTES ABSOLUTE: 1 K/UL (ref 1–4.8)
LYMPHOCYTES RELATIVE PERCENT: 23.3 %
MCH RBC QN AUTO: 33.2 PG (ref 27–31.3)
MCHC RBC AUTO-ENTMCNC: 33 % (ref 33–37)
MCV RBC AUTO: 100.6 FL (ref 80–100)
MONOCYTES ABSOLUTE: 0.4 K/UL (ref 0.2–0.8)
MONOCYTES RELATIVE PERCENT: 7.9 %
NEUTROPHILS ABSOLUTE: 3 K/UL (ref 1.4–6.5)
NEUTROPHILS RELATIVE PERCENT: 68.1 %
PDW BLD-RTO: 16 % (ref 11.5–14.5)
PLATELET # BLD: 184 K/UL (ref 130–400)
POTASSIUM SERPL-SCNC: 4.3 MEQ/L (ref 3.4–4.9)
RBC # BLD: 4.19 M/UL (ref 4.7–6.1)
SODIUM BLD-SCNC: 140 MEQ/L (ref 135–144)
WBC # BLD: 4.5 K/UL (ref 4.8–10.8)

## 2022-03-18 PROCEDURE — 36415 COLL VENOUS BLD VENIPUNCTURE: CPT

## 2022-03-18 PROCEDURE — 80048 BASIC METABOLIC PNL TOTAL CA: CPT

## 2022-03-18 PROCEDURE — 82607 VITAMIN B-12: CPT

## 2022-03-18 PROCEDURE — 99214 OFFICE O/P EST MOD 30 MIN: CPT | Performed by: FAMILY MEDICINE

## 2022-03-18 PROCEDURE — 82746 ASSAY OF FOLIC ACID SERUM: CPT

## 2022-03-18 PROCEDURE — 85025 COMPLETE CBC W/AUTO DIFF WBC: CPT

## 2022-03-18 RX ORDER — FERROUS SULFATE 325(65) MG
325 TABLET ORAL 2 TIMES DAILY
Qty: 60 TABLET | Refills: 3
Start: 2022-03-18 | End: 2022-06-09 | Stop reason: SDUPTHER

## 2022-03-18 RX ORDER — PHENOL 1.4 %
AEROSOL, SPRAY (ML) MUCOUS MEMBRANE
Qty: 30 TABLET | Refills: 0
Start: 2022-03-18 | End: 2022-04-29

## 2022-03-18 RX ORDER — LEVOTHYROXINE SODIUM 112 UG/1
112 TABLET ORAL DAILY
Qty: 90 TABLET | Refills: 3
Start: 2022-03-18 | End: 2022-08-31

## 2022-03-18 ASSESSMENT — ENCOUNTER SYMPTOMS
CONSTIPATION: 0
BLOOD IN STOOL: 0
SHORTNESS OF BREATH: 0
NAUSEA: 0
DIARRHEA: 0
ABDOMINAL PAIN: 0
COUGH: 0
APNEA: 0
VOMITING: 0
CHEST TIGHTNESS: 0

## 2022-03-18 NOTE — PROGRESS NOTES
Subjective:      Patient ID: Selma Adkins is a 80 y.o. male who presents today for:     Chief Complaint   Patient presents with   [facility].       HPI  Patient is a very pleasant 78-year-old male presents today to establish care. He is joined today by his wife who helps and assists in the history. Hypothyroidism: Controlled based on last labs from December 2021. 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 Patient has been found multiple times within this year driving and becoming lost and not returning home. But please have had to be called to find him. He has had his license held but it has been returned to him. His wife and his daughter hide his keys. Alissa Griffin He does believe that he is an active  for the [employer], otherwise is aware of time date and place. His wife helps maintain his medications as patient has been more forgetful recently. He has never had imaging of his head nor seen neurology    Upon review echocardiogram from 2020 showed mildly depressed ejection fraction and diastolic dysfunction. Is unclear if patient declined further cardiac testing or evaluation but he has no complaints of palpitations, chest discomfort, shortness of breath or lower extremity edema. Patient also has a history of chronic kidney disease in the 3A/3B category, which has been stable  History reviewed. No pertinent past medical history. Past Surgical History:   Procedure Laterality Date    COLONOSCOPY N/A 5/1/2020    COLONOSCOPY ROOM 175 performed by Angie Villafana MD at [facility]       History reviewed. No pertinent family history.   Social History     Socioeconomic History    Marital status:      Spouse name: Not on file    Number of children: Not on file    Years of education: Not on file    Highest education level: Not on file   Occupational History    Not on file   Tobacco Use    Smoking status: Never Smoker    Smokeless tobacco: Never Used   Substance and Sexual Activity    Alcohol use: Never    Drug use: Not on file    Sexual activity: Not on file   Other Topics Concern    Not on file   Social History Narrative    Not on file     Social Determinants of Health     Financial Resource Strain: Low Risk     Difficulty of Paying Living Expenses: Not very hard   Food Insecurity: No Food Insecurity    Worried About Running Out of Food in the Last Year: Never true    Elvia of Food in the Last Year: Never true   Transportation Needs:     Lack of Transportation (Medical): Not on file    Lack of Transportation (Non-Medical): Not on file   Physical Activity:     Days of Exercise per Week: Not on file    Minutes of Exercise per Session: Not on file   Stress:     Feeling of Stress : Not on file   Social Connections:     Frequency of Communication with Friends and Family: Not on file    Frequency of Social Gatherings with Friends and Family: Not on file    Attends Rastafarian Services: Not on file    Active Member of [address] [place] [address] or Organizations: Not on file    Attends Club or Organization Meetings: Not on file    Marital Status: Not on file   Intimate Partner Violence:     Fear of Current or Ex-Partner: Not on file    Emotionally Abused: Not on file    Physically Abused: Not on file    Sexually Abused: Not on file   Housing Stability:     Unable to Pay for Housing in the Last Year: Not on file    Number of [place] in the Last Year: Not on file    Unstable Housing in the Last Year: Not on file     Current Outpatient Medications on File Prior to Visit   Medication Sig Dispense Refill    latanoprost (XALATAN) 0.005 % ophthalmic solution  (Patient not taking: Reported on 3/18/2022)       No current facility-administered medications on file prior to visit.        Allergies:  Baclofen    Review of Systems   Constitutional: Negative for activity change, appetite change, fatigue, fever and unexpected weight change. Respiratory: Negative for apnea, cough, chest tightness and shortness of breath. Cardiovascular: Negative for chest pain, palpitations and leg swelling. Gastrointestinal: Negative for abdominal pain, blood in stool, constipation, diarrhea, nausea and vomiting. Musculoskeletal: Negative for arthralgias. Neurological: Positive for dizziness (Not recently). Negative for tremors, seizures, syncope, facial asymmetry, speech difficulty, weakness, light-headedness, numbness and headaches. Memory deficit   Psychiatric/Behavioral: Negative for hallucinations and suicidal ideas. Objective:   /70   Pulse 63   Temp 97.7 °F (36.5 °C) (Infrared)   Wt 220 lb 6.4 oz (100 kg)   SpO2 99%   BMI 29.89 kg/m²     Physical Exam  Vitals and nursing note reviewed. Constitutional:       General: He is not in acute distress. Appearance: Normal appearance. He is well-developed. He is not diaphoretic. HENT:      Head: Normocephalic and atraumatic. Nose: Nose normal.      Mouth/Throat:      Mouth: Mucous membranes are moist.      Pharynx: Oropharynx is clear. Eyes:      Conjunctiva/sclera: Conjunctivae normal.      Pupils: Pupils are equal, round, and reactive to light. Cardiovascular:      Rate and Rhythm: Normal rate and regular rhythm. Heart sounds: Normal heart sounds. No murmur heard. No friction rub. No gallop. Pulmonary:      Effort: Pulmonary effort is normal. No respiratory distress. Breath sounds: Normal breath sounds. No wheezing or rales. Chest:      Chest wall: No tenderness. Abdominal:      General: Abdomen is flat. Bowel sounds are normal.      Palpations: Abdomen is soft. Tenderness: There is no abdominal tenderness. Musculoskeletal:      Cervical back: Normal range of motion. Right lower leg: Edema (Trace) present. Left lower leg: Edema (Trace) present.    Skin: type  Continue current medication  - Melatonin 10 MG TABS; Take 1 tablet 15 minutes prior to bed. Dispense: 30 tablet; Refill: 0      Return in about 2 months (around 5/18/2022) for AWV, chronic condtions.     Kelechi Denis MD

## 2022-03-19 LAB
FOLATE: 4.9 NG/ML
VITAMIN B-12: 358 PG/ML (ref 232–1245)

## 2022-03-23 DIAGNOSIS — N18.32 STAGE 3B CHRONIC KIDNEY DISEASE (HCC): ICD-10-CM

## 2022-03-23 DIAGNOSIS — D72.819 LEUKOPENIA, UNSPECIFIED TYPE: ICD-10-CM

## 2022-03-23 DIAGNOSIS — D64.9 ANEMIA, UNSPECIFIED TYPE: Primary | ICD-10-CM

## 2022-03-24 NOTE — RESULT ENCOUNTER NOTE
Please notify patient and wife that recent lab work shows the followin. He has normal vitamin B12 and folate levels,2. He has impaired, but stable kidney function testing consistent with previous results going back 2 years. This is consistent with a diagnosis of stage 3 chronic kidney disease. The goal long-term will be to preserve kidney function I am closely monitoring blood pressure, blood sugar, and cholesterol. Patient should remain well-hydrated and drink at least 48 ounces of water daily3. His blood counts show a stable mild anemia and a mildly low white blood cell count. The anemia may be related to his chronic kidney disease, however, with a listed history of GI bleeding in his chart the recommendation would be to repeat testing in 1 month to reassess. I have left a future CBC order in chart, please instruct patient and wife to return the lab in 1 month for this testing.

## 2022-03-31 ENCOUNTER — TELEMEDICINE (OUTPATIENT)
Dept: FAMILY MEDICINE CLINIC | Age: 87
End: 2022-03-31
Payer: MEDICARE

## 2022-03-31 DIAGNOSIS — Z00.00 MEDICARE ANNUAL WELLNESS VISIT, SUBSEQUENT: Primary | ICD-10-CM

## 2022-03-31 PROCEDURE — G0439 PPPS, SUBSEQ VISIT: HCPCS

## 2022-03-31 ASSESSMENT — LIFESTYLE VARIABLES
HOW MANY STANDARD DRINKS CONTAINING ALCOHOL DO YOU HAVE ON A TYPICAL DAY: 1 OR 2
HOW OFTEN DO YOU HAVE A DRINK CONTAINING ALCOHOL: 2-4 TIMES A MONTH

## 2022-03-31 ASSESSMENT — PATIENT HEALTH QUESTIONNAIRE - PHQ9
SUM OF ALL RESPONSES TO PHQ QUESTIONS 1-9: 0
1. LITTLE INTEREST OR PLEASURE IN DOING THINGS: 0
SUM OF ALL RESPONSES TO PHQ QUESTIONS 1-9: 0

## 2022-03-31 NOTE — PATIENT INSTRUCTIONS
Personalized Preventive Plan for Corita Crigler - 3/31/2022  Medicare offers a range of preventive health benefits. Some of the tests and screenings are paid in full while other may be subject to a deductible, co-insurance, and/or copay. Some of these benefits include a comprehensive review of your medical history including lifestyle, illnesses that may run in your family, and various assessments and screenings as appropriate. After reviewing your medical record and screening and assessments performed today your provider may have ordered immunizations, labs, imaging, and/or referrals for you. A list of these orders (if applicable) as well as your Preventive Care list are included within your After Visit Summary for your review. Other Preventive Recommendations:    · A preventive eye exam performed by an eye specialist is recommended every 1-2 years to screen for glaucoma; cataracts, macular degeneration, and other eye disorders. · A preventive dental visit is recommended every 6 months. · Try to get at least 150 minutes of exercise per week or 10,000 steps per day on a pedometer . · Order or download the FREE \"Exercise & Physical Activity: Your Everyday Guide\" from [employer]. Call 6-992.346.5955 or search [employer] online. · You need 1407-2982 mg of calcium and 8974-2297 IU of vitamin D per day. It is possible to meet your calcium requirement with diet alone, but a vitamin D supplement is usually necessary to meet this goal.  · When exposed to the sun, use a sunscreen that protects against both UVA and UVB radiation with an SPF of 30 or greater. Reapply every 2 to 3 hours or after sweating, drying off with a towel, or swimming. · Always wear a seat belt when traveling in a car. Always wear a helmet when riding a bicycle or motorcycle.

## 2022-03-31 NOTE — PROGRESS NOTES
Medicare Annual Wellness Visit    Selma Adkins is here for Medicare AWV    Assessment & Plan   Medicare annual wellness visit, subsequent      Recommendations for Preventive Services Due: see orders and patient instructions/AVS.  Recommended screening schedule for the next 5-10 years is provided to the patient in written form: see Patient Instructions/AVS.     Return for Medicare Annual Wellness Visit in 1 year. Subjective   The following acute and/or chronic problems were also addressed today:  Loss of memory. Patient had recent visit with PCP. Referral has been placed for neurology wife reports that she will call today to set up the appointment. Patient's complete Health Risk Assessment and screening values have been reviewed and are found in Flowsheets. The following problems were reviewed today and where indicated follow up appointments were made and/or referrals ordered. Positive Risk Factor Screenings with Interventions:     Cognitive: Words recalled: 0 Words Recalled  Total Score Interpretation: Abnormal Mini-Cog    Cognitive Impairment Interventions:  · Wife reports that she is setting appointment for neurology to evaluate recent changes in cognition and memory. Was seen by PCP on March 18 for this issue.            General Health and ACP:  General  In general, how would you say your health is?: Very Good  In the past 7 days, have you experienced any of the following: New or Increased Pain, New or Increased Fatigue, Loneliness, Social Isolation, Stress or Anger?: No  Do you get the social and emotional support that you need?: Yes  Do you have a Living Will?: Yes    Advance Directives     Power of  Living Will ACP-Advance Directive ACP-Power of [place] Guardian on 03/18/22 Not on File Not on File Filed      General Health Risk Interventions:  · Poor self-assessment of health status: patient declines any further evaluation/treatment for this issue    Health Habits/Nutrition:     Physical Activity: Inactive    Days of Exercise per Week: 0 days    Minutes of Exercise per Session: 0 min     Have you lost any weight without trying in the past 3 months?: No     Have you seen the dentist within the past year?: N/A - wear dentures    Health Habits/Nutrition Interventions:  · Inadequate physical activity:  patient is not ready to increase his/her physical activity level at this time    Hearing/Vision:  Do you or your family notice any trouble with your hearing that hasn't been managed with hearing aids?: (!) Yes  Do you have difficulty driving, watching TV, or doing any of your daily activities because of your eyesight?: No  Have you had an eye exam within the past year?: Yes  No exam data present    Hearing/Vision Interventions:  · Hearing concerns:  Patient has hearing aids. He reports they were fine. ADLs:  In the past 7 days, did you need help from others to perform any of the following everyday activities: Eating, dressing, grooming, bathing, toileting, or walking/balance?: No  In the past 7 days, did you need help from others to take care of any of the following: Laundry, housekeeping, banking/finances, shopping, telephone use, food preparation, transportation, or taking medications?: (!) Yes  Select all that apply: (!) Transportation    ADL Interventions:  · Patient has family nearby to provide transportation          Objective      Patient-Reported Vitals  No data recorded            Allergies   Allergen Reactions    Baclofen      Prior to Visit Medications    Medication Sig Taking? Authorizing Provider   levothyroxine (SYNTHROID) 112 MCG tablet Take 1 tablet by mouth Daily  Meng Mata MD   ferrous sulfate (IRON 325) 325 (65 Fe) MG tablet Take 1 tablet by mouth 2 times daily  Emy South MD   Melatonin 10 MG TABS Take 1 tablet 15 minutes prior to bed.   Emy South MD   latanoprost (XALATAN) 0.005 % ophthalmic solution   Historical Provider, MD Dubon (Including outside providers/suppliers regularly involved in providing care):   Patient Care Team:  Penelope Buckner MD as PCP - General (Family Medicine)  Penelope Buckner MD as PCP - [facility] Empaneled Provider    Reviewed and updated this visit:  Ninoska Cantor, was evaluated through a synchronous (real-time) audio-video encounter. The patient (or guardian if applicable) is aware that this is a billable service, which includes applicable co-pays. This Virtual Visit was conducted with patient's (and/or legal guardian's) consent. The visit was conducted pursuant to the emergency declaration under the [address] [place] [address] [facility] authority and the [employer] Act. Patient identification was verified, and a caregiver was present when appropriate. The patient was located at home in a state where the provider was licensed to provide care.

## 2022-04-06 ENCOUNTER — HOSPITAL ENCOUNTER (OUTPATIENT)
Dept: CT IMAGING | Age: 87
Discharge: HOME | End: 2022-04-08
Payer: MEDICARE

## 2022-04-06 DIAGNOSIS — R41.3 MEMORY DEFICIT: ICD-10-CM

## 2022-04-06 PROCEDURE — 70450 CT HEAD/BRAIN W/O DYE: CPT

## 2022-04-07 DIAGNOSIS — J34.89 MAXILLARY SINUS MASS: Primary | ICD-10-CM

## 2022-04-21 ENCOUNTER — HOSPITAL ENCOUNTER (OUTPATIENT)
Dept: CT IMAGING | Age: 87
Discharge: HOME | End: 2022-04-23
Payer: MEDICARE

## 2022-04-21 DIAGNOSIS — J34.89 MASS OF SINUS: ICD-10-CM

## 2022-04-21 PROCEDURE — 70486 CT MAXILLOFACIAL W/O DYE: CPT

## 2022-04-29 ENCOUNTER — OFFICE VISIT (OUTPATIENT)
Dept: CARDIOLOGY CLINIC | Age: 87
End: 2022-04-29
Payer: MEDICARE

## 2022-04-29 VITALS
HEIGHT: 72 IN | OXYGEN SATURATION: 99 % | HEART RATE: 56 BPM | DIASTOLIC BLOOD PRESSURE: 66 MMHG | RESPIRATION RATE: 18 BRPM | SYSTOLIC BLOOD PRESSURE: 124 MMHG | WEIGHT: 216 LBS | BODY MASS INDEX: 29.26 KG/M2

## 2022-04-29 DIAGNOSIS — G45.9 TIA (TRANSIENT ISCHEMIC ATTACK): ICD-10-CM

## 2022-04-29 DIAGNOSIS — I50.9 CHRONIC HEART FAILURE, UNSPECIFIED HEART FAILURE TYPE (HCC): Primary | ICD-10-CM

## 2022-04-29 PROCEDURE — 99204 OFFICE O/P NEW MOD 45 MIN: CPT | Performed by: INTERNAL MEDICINE

## 2022-04-29 PROCEDURE — 93000 ELECTROCARDIOGRAM COMPLETE: CPT | Performed by: INTERNAL MEDICINE

## 2022-04-29 RX ORDER — ASPIRIN 81 MG/1
81 TABLET ORAL DAILY
COMMUNITY

## 2022-04-29 ASSESSMENT — ENCOUNTER SYMPTOMS
WHEEZING: 0
CHEST TIGHTNESS: 0
NAUSEA: 0
SHORTNESS OF BREATH: 0
EYES NEGATIVE: 1
GASTROINTESTINAL NEGATIVE: 1
COUGH: 0
STRIDOR: 0
RESPIRATORY NEGATIVE: 1
BLOOD IN STOOL: 0

## 2022-04-29 NOTE — PROGRESS NOTES
NEW PATIENT        Patient: Bassam Ackerman  YOB: 1930  MRN: [id number]    Chief Complaint: CMP CKD TIA  Chief Complaint   Patient presents with   Joseph Leon Cardiologist     Dr. Trev Coronado referral    Congestive Heart Failure     ECHO 2020       CV Data:  4/20 Echo EF 45     Subjective/HPI  Referred for CHF.   4/2020 Echo EF 45%. Pt denies any complaints but wife states he has slowed down al lot. Pt states not to everything. EKG: SR 57 LBBB  nonsnmoker  No ETOH  Lives with wife  Retired-     Past Medical History:   Diagnosis Date    [facility]est[facility]     Stage 3b chronic kidney disease ([facility] 3/23/2022       Past Surgical History:   Procedure Laterality Date    COLONOSCOPY N/A 5/1/2020    COLONOSCOPY ROOM 175 performed by Nick Conway MD at [facility]         History reviewed. No pertinent family history. Social History     Socioeconomic History    Marital status:      Spouse name: None    Number of children: None    Years of education: None    Highest education level: None   Occupational History    None   Tobacco Use    Smoking status: Never Smoker    Smokeless tobacco: Never Used   Substance and Sexual Activity    Alcohol use: Never    Drug use: None    Sexual activity: None   Other Topics Concern    None   Social History Narrative    None     Social Determinants of Health     Financial Resource Strain: Low Risk     Difficulty of Paying Living Expenses: Not very hard   Food Insecurity: No Food Insecurity    Worried About Running Out of Food in the Last Year: Never true    Elvia of Food in the Last Year: Never true   Transportation Needs:     Lack of Transportation (Medical): Not on file    Lack of Transportation (Non-Medical):  Not on file   Physical Activity: Inactive    Days of Exercise per Week: 0 days    Minutes of Exercise per Session: 0 min   Stress:     Feeling of Stress : Not on file   Social Connections:     Frequency of Communication with Friends and Family: Not on file    Frequency of Social Gatherings with Friends and Family: Not on file    Attends Anglican Services: Not on file    Active Member of Clubs or Organizations: Not on file    Attends Club or Organization Meetings: Not on file    Marital Status: Not on file   Intimate Partner Violence:     Fear of Current or Ex-Partner: Not on file    Emotionally Abused: Not on file    Physically Abused: Not on file    Sexually Abused: Not on file   Housing Stability:     Unable to Pay for Housing in the Last Year: Not on file    Number of [place] in the Last Year: Not on file    Unstable Housing in the Last Year: Not on file       Allergies   Allergen Reactions    Baclofen        Current Outpatient Medications   Medication Sig Dispense Refill    aspirin 81 MG EC tablet Take 81 mg by mouth daily      levothyroxine (SYNTHROID) 112 MCG tablet Take 1 tablet by mouth Daily 90 tablet 3    ferrous sulfate (IRON 325) 325 (65 Fe) MG tablet Take 1 tablet by mouth 2 times daily 60 tablet 3    latanoprost (XALATAN) 0.005 % ophthalmic solution        No current facility-administered medications for this visit. Review of Systems:   Review of Systems   Constitutional: Negative. Negative for diaphoresis and fatigue. HENT: Negative. Eyes: Negative. Respiratory: Negative. Negative for cough, chest tightness, shortness of breath, wheezing and stridor. Cardiovascular: Negative. Negative for chest pain, palpitations and leg swelling. Gastrointestinal: Negative. Negative for blood in stool and nausea. Genitourinary: Negative. Musculoskeletal: Negative. Skin: Negative. Neurological: Negative. Negative for dizziness, syncope, weakness and light-headedness. Hematological: Negative. Psychiatric/Behavioral: Negative.           Physical Examination:    /66 (Site: Left Upper Arm, Position: Sitting, Cuff Size: Medium Adult)   Pulse 56   Resp 18   Ht 6' (1.829 m)   Wt 216 lb (98 kg)   SpO2 99%   BMI 29.29 kg/m²    Physical Exam   Constitutional: He appears healthy. No distress. HENT:   Normal cephalic and Atraumatic   Eyes: Pupils are equal, round, and reactive to light. Neck: Thyroid normal. No JVD present. No neck adenopathy. No thyromegaly present. Cardiovascular: Normal rate, regular rhythm, normal heart sounds, intact distal pulses and normal pulses. Pulmonary/Chest: Effort normal and breath sounds normal. He has no wheezes. He has no rales. He exhibits no tenderness. Abdominal: Soft. Bowel sounds are normal. There is no abdominal tenderness. Musculoskeletal:         General: No tenderness or edema. Normal range of motion. Cervical back: Normal range of motion and neck supple. Neurological: He is alert and oriented to person, place, and time. Skin: Skin is warm. No cyanosis. Nails show no clubbing.        LABS:  CBC:   Lab Results   Component Value Date    WBC 4.5 03/18/2022    RBC 4.19 03/18/2022    HGB 13.9 03/18/2022    HCT 42.2 03/18/2022    .6 03/18/2022    MCH 33.2 03/18/2022    MCHC 33.0 03/18/2022    RDW 16.0 03/18/2022     03/18/2022     Lipids:  Lab Results   Component Value Date    CHOL 217 (H) 02/17/2020     Lab Results   Component Value Date    TRIG 204 (H) 02/17/2020     Lab Results   Component Value Date    HDL 44 02/17/2020     Lab Results   Component Value Date    LDLCALC 132 (H) 02/17/2020     No results found for: LABVLDL, VLDL  No results found for: CHOLHDLRATIO  CMP:    Lab Results   Component Value Date     03/18/2022    K 4.3 03/18/2022     03/18/2022    CO2 18 03/18/2022    BUN 21 03/18/2022    CREATININE 1.71 03/18/2022    GFRAA 45.5 03/18/2022    LABGLOM 37.6 03/18/2022    GLUCOSE 106 03/18/2022    PROT 7.6 10/07/2021    LABALBU 4.2 10/07/2021    CALCIUM 9.3 03/18/2022    BILITOT 0.5 10/07/2021    ALKPHOS 63 10/07/2021    AST 17 10/07/2021    ALT <5 10/07/2021     BMP:    Lab Results   Component Value Date     03/18/2022    K 4.3 03/18/2022     03/18/2022    CO2 18 03/18/2022    BUN 21 03/18/2022    LABALBU 4.2 10/07/2021    CREATININE 1.71 03/18/2022    CALCIUM 9.3 03/18/2022    GFRAA 45.5 03/18/2022    LABGLOM 37.6 03/18/2022    GLUCOSE 106 03/18/2022     Magnesium:    Lab Results   Component Value Date    MG 2.1 05/01/2020     TSH:No results found for: TSHFT4, TSH          Patient Active Problem List   Diagnosis    GI bleed    Hypothyroidism    Benign prostatic hyperplasia    Stage 3b chronic kidney disease ([address]       Medications Discontinued During This Encounter   Medication Reason    Melatonin 10 MG TABS LIST CLEANUP       Modified Medications    No medications on file       No orders of the defined types were placed in this encounter. Assessment/Plan:    1. Chronic heart failure, unspecified heart failure type ([address]  Pt is compensated with no complaints. He max continue low dsoe asa. Low salt diet. - EKG 12 Lead    2. TIA (transient ischemic attack)  asa       Counseling:  Heart Healthy Lifestyle, Low Salt Diet, Take Precautions to Prevent Falls and Walk Daily    Return in about 6 months (around 10/29/2022).     Electronically signed by Abdulkadir Mcmahon MD on 4/29/2022 at 12:10 PM

## 2022-06-07 ENCOUNTER — TELEPHONE (OUTPATIENT)
Dept: FAMILY MEDICINE CLINIC | Age: 87
End: 2022-06-07

## 2022-06-07 DIAGNOSIS — D64.9 ANEMIA, UNSPECIFIED TYPE: Primary | ICD-10-CM

## 2022-06-07 DIAGNOSIS — D64.9 ANEMIA, UNSPECIFIED TYPE: ICD-10-CM

## 2022-06-07 DIAGNOSIS — E03.9 HYPOTHYROIDISM, UNSPECIFIED TYPE: ICD-10-CM

## 2022-06-07 NOTE — TELEPHONE ENCOUNTER
----- Message from Sandra Noguera sent at 6/7/2022 10:19 AM EDT -----  Subject: Referral Request    QUESTIONS   Reason for referral request? bloodwork   Has the physician seen you for this condition before? No   Preferred Specialist (if applicable)? Do you already have an appointment scheduled? Yes  Select Scheduled Date? 2022-07-01  Select Scheduled Physician? Meng Bills   Additional Information for Provider? bloodwork orders needed for upcomming   appt 11:15 7/1   ---------------------------------------------------------------------------  --------------  CALL BACK INFO  What is the best way for the office to contact you? OK to leave message on   voicemail  Preferred Call Back Phone Number? [phone]  ---------------------------------------------------------------------------  --------------  SCRIPT ANSWERS  Relationship to Patient? Other  Representative Name? Joann Lopez  Is the Representative on the appropriate HIPAA document in Epic?  Yes

## 2022-06-07 NOTE — TELEPHONE ENCOUNTER
Patient is requesting medication refill.  Please approve or deny this request.    Rx requested:  Requested Prescriptions     Pending Prescriptions Disp Refills    ferrous sulfate (IRON 325) 325 (65 Fe) MG tablet 60 tablet 3     Sig: Take 1 tablet by mouth 2 times daily         Last Office Visit:   3/18/2022      Next Visit Date:  Future Appointments   Date Time Provider Megan Jasso   7/[facility]/2022 11:20 AM [facility]   10/28/2022  1:00 PM Carlita Gardner MD [facility]

## 2022-06-09 RX ORDER — FERROUS SULFATE 325(65) MG
325 TABLET ORAL 2 TIMES DAILY
Qty: 60 TABLET | Refills: 1 | Status: SHIPPED | OUTPATIENT
Start: 2022-06-09

## 2022-06-13 NOTE — TELEPHONE ENCOUNTER
1st attempt to reach patient. Called patient @ [phone] and left message on machine for patient to return call during normal business hours of 8:30 AM and 5 PM @ [phone] option 2. Left detailed message about labs.

## 2022-07-01 ENCOUNTER — OFFICE VISIT (OUTPATIENT)
Dept: FAMILY MEDICINE CLINIC | Age: 87
End: 2022-07-01
Payer: MEDICARE

## 2022-07-01 VITALS
OXYGEN SATURATION: 98 % | DIASTOLIC BLOOD PRESSURE: 62 MMHG | WEIGHT: 212 LBS | HEART RATE: 68 BPM | SYSTOLIC BLOOD PRESSURE: 100 MMHG | TEMPERATURE: 97.6 F | BODY MASS INDEX: 28.75 KG/M2

## 2022-07-01 DIAGNOSIS — Z00.00 ANNUAL PHYSICAL EXAM: Primary | ICD-10-CM

## 2022-07-01 DIAGNOSIS — R41.3 MEMORY DEFICIT: ICD-10-CM

## 2022-07-01 DIAGNOSIS — J34.89 MAXILLARY SINUS MASS: ICD-10-CM

## 2022-07-01 PROCEDURE — 99397 PER PM REEVAL EST PAT 65+ YR: CPT | Performed by: FAMILY MEDICINE

## 2022-07-01 SDOH — ECONOMIC STABILITY: FOOD INSECURITY: WITHIN THE PAST 12 MONTHS, THE FOOD YOU BOUGHT JUST DIDN'T LAST AND YOU DIDN'T HAVE MONEY TO GET MORE.: NEVER TRUE

## 2022-07-01 SDOH — ECONOMIC STABILITY: FOOD INSECURITY: WITHIN THE PAST 12 MONTHS, YOU WORRIED THAT YOUR FOOD WOULD RUN OUT BEFORE YOU GOT MONEY TO BUY MORE.: NEVER TRUE

## 2022-07-01 ASSESSMENT — ENCOUNTER SYMPTOMS
SHORTNESS OF BREATH: 0
VOMITING: 0
DIARRHEA: 0
CHEST TIGHTNESS: 0
APNEA: 0
ABDOMINAL PAIN: 0
COUGH: 0
BLOOD IN STOOL: 0
NAUSEA: 0
CONSTIPATION: 0

## 2022-07-01 ASSESSMENT — SOCIAL DETERMINANTS OF HEALTH (SDOH): HOW HARD IS IT FOR YOU TO PAY FOR THE VERY BASICS LIKE FOOD, HOUSING, MEDICAL CARE, AND HEATING?: NOT HARD AT ALL

## 2022-07-01 NOTE — PROGRESS NOTES
2022    Fiona Smith (:  1930) is a 80 y.o. male, here for a preventive medicine evaluation. Patient is joined today by his wife. Patient is still having memory deficits but patient has not pleased with neurology yet. 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 He denies any pain, discomfort or congestion    Patient Active Problem List   Diagnosis    GI bleed    Hypothyroidism    Benign prostatic hyperplasia    Stage 3b chronic kidney disease ([facility]       Review of Systems   Constitutional: Negative for activity change, appetite change and fatigue. Respiratory: Negative for apnea, cough, chest tightness and shortness of breath. Cardiovascular: Negative for chest pain, palpitations and leg swelling. Gastrointestinal: Negative for abdominal pain, blood in stool, constipation, diarrhea, nausea and vomiting. Musculoskeletal: Negative for arthralgias. Neurological: Negative for seizures and headaches. Psychiatric/Behavioral: Negative for hallucinations and suicidal ideas. Prior to Visit Medications    Medication Sig Taking?  Authorizing Provider   ferrous sulfate (IRON 325) 325 (65 Fe) MG tablet Take 1 tablet by mouth 2 times daily Yes Glendy Patel MD   aspirin 81 MG EC tablet Take 81 mg by mouth daily Yes Historical Provider, MD   levothyroxine (SYNTHROID) 112 MCG tablet Take 1 tablet by mouth Daily Yes Glendy Patel MD   latanoprost (XALATAN) 0.005 % ophthalmic solution  Yes Historical Provider, MD        Allergies   Allergen Reactions    Baclofen        Past Medical History:   Diagnosis Date    CHF (congestive heart failure) ([facility]     [facility] 3[facility]23/2022       Past Surgical History:   Procedure Laterality Date    COLONOSCOPY N/A 2020    COLONOSCOPY ROOM 175 performed by Rocio Parsons MD at [facility]         Social History     Socioeconomic History    Marital status:  Spouse name: Not on file    Number of children: Not on file    Years of education: Not on file    Highest education level: Not on file   Occupational History    Not on file   Tobacco Use    Smoking status: Never Smoker    Smokeless tobacco: Never Used   Substance and Sexual Activity    Alcohol use: Never    Drug use: Not on file    Sexual activity: Not on file   Other Topics Concern    Not on file   Social History Narrative    Not on file     Social Determinants of Health     Financial Resource Strain: Low Risk     Difficulty of Paying Living Expenses: Not hard at all   Food Insecurity: No Food Insecurity    Worried About [address] in the Last Year: Never true    [address] [facility] [address] in the Last Year: Never true   Transportation Needs:     Lack of Transportation (Medical): Not on file    Lack of Transportation (Non-Medical): Not on file   Physical Activity: Inactive    Days of Exercise per Week: 0 days    Minutes of Exercise per Session: 0 min   Stress:     Feeling of Stress : Not on file   Social Connections:     Frequency of Communication with Friends and Family: Not on file    Frequency of Social Gatherings with Friends and Family: Not on file    Attends Denominational Services: Not on file    Active Member of Clubs or Organizations: Not on file    Attends Club or Organization Meetings: Not on file    Marital Status: Not on file   Intimate Partner Violence:     Fear of Current or Ex-Partner: Not on file    Emotionally Abused: Not on file    Physically Abused: Not on file    Sexually Abused: Not on file   Housing Stability:     Unable to Pay for Housing in the Last Year: Not on file    Number of [place] in the Last Year: Not on file    Unstable Housing in the Last Year: Not on file        No family history on file.     ADVANCE DIRECTIVE: N, <no information>    Vitals:    07/01/22 1107   BP: 100/62   Pulse: 68   Temp: 97.6 °F (36.4 °C)   TempSrc: Infrared   SpO2: 98%   Weight: 212 lb (96.2 kg)     Estimated body mass index is 28.75 kg/m² as calculated from the following:    Height as of 4/29/22: 6' (1.829 m). Weight as of this encounter: 212 lb (96.2 kg). Physical Exam  Vitals and nursing note reviewed. Constitutional:       General: He is not in acute distress. Appearance: Normal appearance. He is well-developed. He is not diaphoretic. HENT:      Head: Normocephalic and atraumatic. Nose: Nose normal.      Mouth/Throat:      Mouth: Mucous membranes are moist.      Pharynx: Oropharynx is clear. Eyes:      Conjunctiva/sclera: Conjunctivae normal.      Pupils: Pupils are equal, round, and reactive to light. Cardiovascular:      Rate and Rhythm: Normal rate and regular rhythm. Heart sounds: Normal heart sounds. No murmur heard. No friction rub. No gallop. Pulmonary:      Effort: Pulmonary effort is normal. No respiratory distress. Breath sounds: Normal breath sounds. No wheezing or rales. Chest:      Chest wall: No tenderness. Abdominal:      General: Abdomen is flat. Bowel sounds are normal.      Palpations: Abdomen is soft. Tenderness: There is no abdominal tenderness. Musculoskeletal:      Cervical back: Normal range of motion. Skin:     General: Skin is warm and dry. Comments: Varicosities bilaterally on lower extremities   Neurological:      Mental Status: He is alert and oriented to person, place, and time. Psychiatric:         Behavior: Behavior normal.         Thought Content: Thought content normal.         Judgment: Judgment normal.         No flowsheet data found. Lab Results   Component Value Date/Time    CHOL 217 02/17/2020 04:34 PM    TRIG 204 02/17/2020 04:34 PM    HDL 44 02/17/2020 04:34 PM    LDLCALC 132 02/17/2020 04:34 PM    GLUCOSE 106 03/18/2022 10:53 AM       The ASCVD Risk score (Lonza Cameron., et al., 2013) failed to calculate for the following reasons:     The 2013 ASCVD risk score is only valid for ages 36 to 78    Immunization History   Administered Date(s) Administered    COVID-19, PFIZER PURPLE top, DILUTE for use, (age 15 y+), 30mcg/0.3mL 03/05/2021, 03/26/2021, 10/11/2021    Influenza A ([id number]) Vaccine PF IM 02/02/2010    Influenza, High Dose (Fluzone 65 yrs and older) 10/10/2016, 11/06/2018    Influenza, Quadv, IM, PF (6 mo and older Fluzone, Flulaval, Fluarix, and 3 yrs and older Afluria) 02/03/2018, 09/08/2020    Influenza, Quadv, adjuvanted, 65 yrs +, IM, PF (Fluad) 09/08/2020    Influenza, Triv, inactivated, subunit, adjuvanted, IM (Fluad 65 yrs and older) 10/17/2019    Pneumococcal Conjugate 13-valent (Noreene Hoof) 10/10/2016, 07/13/2018    Pneumococcal Polysaccharide ([id number]) 11/12/2013       Health Maintenance   Topic Date Due    DTaP/Tdap/Td vaccine (1 - Tdap) Never done    Shingles vaccine (1 of 2) Never done    Flu vaccine (1) 09/01/2022    Depression Screen  03/31/2023    Annual Wellness Visit (AWV)  04/01/2023    Pneumococcal 65+ years Vaccine  Completed    COVID-19 Vaccine  Completed    Hepatitis A vaccine  Aged Out    Hepatitis B vaccine  Aged Out    Hib vaccine  Aged Out    Meningococcal (ACWY) vaccine  Aged Out       Assessment & Plan   Annual physical exam  -Recommend continued healthy lifestyle practices  Maxillary sinus mass  -Follow-up with ENT specialist  Memory deficit  -Patient to follow-up with neurology. Referral information provided again    Return in about 6 months (around 1/1/2023).          --Waylon Echeverria MD

## 2022-07-29 DIAGNOSIS — R46.89 ABNORMAL BEHAVIOR: ICD-10-CM

## 2022-07-29 DIAGNOSIS — R41.3 MEMORY DEFICIT: Primary | ICD-10-CM

## 2022-08-30 ENCOUNTER — HOSPITAL ENCOUNTER (OUTPATIENT)
Age: 87
Discharge: HOME | End: 2022-09-01
Payer: MEDICARE

## 2022-08-30 ENCOUNTER — HOSPITAL ENCOUNTER (OUTPATIENT)
Dept: GENERAL RADIOLOGY | Age: 87
Discharge: HOME | End: 2022-09-01
Payer: MEDICARE

## 2022-08-30 ENCOUNTER — HOSPITAL ENCOUNTER (OUTPATIENT)
Dept: LAB | Age: 87
Discharge: HOME | End: 2022-08-30
Payer: MEDICARE

## 2022-08-30 ENCOUNTER — OFFICE VISIT (OUTPATIENT)
Dept: FAMILY MEDICINE CLINIC | Age: 87
End: 2022-08-30
Payer: MEDICARE

## 2022-08-30 VITALS
TEMPERATURE: 97.5 F | HEART RATE: 59 BPM | WEIGHT: 210 LBS | DIASTOLIC BLOOD PRESSURE: 60 MMHG | OXYGEN SATURATION: 99 % | SYSTOLIC BLOOD PRESSURE: 132 MMHG | BODY MASS INDEX: 28.48 KG/M2

## 2022-08-30 DIAGNOSIS — D64.9 ANEMIA, UNSPECIFIED TYPE: ICD-10-CM

## 2022-08-30 DIAGNOSIS — R05.9 COUGH: ICD-10-CM

## 2022-08-30 DIAGNOSIS — R05.9 COUGH: Primary | ICD-10-CM

## 2022-08-30 DIAGNOSIS — E03.9 HYPOTHYROIDISM, UNSPECIFIED TYPE: ICD-10-CM

## 2022-08-30 LAB
ALBUMIN SERPL-MCNC: 3.8 G/DL (ref 3.5–4.6)
ALP BLD-CCNC: 55 U/L (ref 35–104)
ALT SERPL-CCNC: <5 U/L (ref 0–41)
ANION GAP SERPL CALCULATED.3IONS-SCNC: 8 MEQ/L (ref 9–15)
AST SERPL-CCNC: 15 U/L (ref 0–40)
BASOPHILS ABSOLUTE: 0 K/UL (ref 0–0.2)
BASOPHILS RELATIVE PERCENT: 0.5 %
BILIRUB SERPL-MCNC: 0.4 MG/DL (ref 0.2–0.7)
BUN BLDV-MCNC: 19 MG/DL (ref 8–23)
CALCIUM SERPL-MCNC: 8.8 MG/DL (ref 8.5–9.9)
CHLORIDE BLD-SCNC: 106 MEQ/L (ref 95–107)
CO2: 26 MEQ/L (ref 20–31)
CREAT SERPL-MCNC: 1.78 MG/DL (ref 0.7–1.2)
EOSINOPHILS ABSOLUTE: 0 K/UL (ref 0–0.7)
EOSINOPHILS RELATIVE PERCENT: 0.5 %
GFR AFRICAN AMERICAN: 43.4
GFR NON-AFRICAN AMERICAN: 35.9
GLOBULIN: 3.3 G/DL (ref 2.3–3.5)
GLUCOSE BLD-MCNC: 91 MG/DL (ref 70–99)
HCT VFR BLD CALC: 40.9 % (ref 42–52)
HEMOGLOBIN: 13.1 G/DL (ref 14–18)
LYMPHOCYTES ABSOLUTE: 1.3 K/UL (ref 1–4.8)
LYMPHOCYTES RELATIVE PERCENT: 27.8 %
MCH RBC QN AUTO: 32.1 PG (ref 27–31.3)
MCHC RBC AUTO-ENTMCNC: 32 % (ref 33–37)
MCV RBC AUTO: 100.1 FL (ref 80–100)
MONOCYTES ABSOLUTE: 0.3 K/UL (ref 0.2–0.8)
MONOCYTES RELATIVE PERCENT: 7.4 %
NEUTROPHILS ABSOLUTE: 3 K/UL (ref 1.4–6.5)
NEUTROPHILS RELATIVE PERCENT: 63.8 %
PDW BLD-RTO: 15.1 % (ref 11.5–14.5)
PLATELET # BLD: 197 K/UL (ref 130–400)
POTASSIUM SERPL-SCNC: 3.7 MEQ/L (ref 3.4–4.9)
RBC # BLD: 4.09 M/UL (ref 4.7–6.1)
SODIUM BLD-SCNC: 140 MEQ/L (ref 135–144)
T4 FREE: 0.88 NG/DL (ref 0.84–1.68)
TOTAL PROTEIN: 7.1 G/DL (ref 6.3–8)
TSH SERPL DL<=0.05 MIU/L-ACNC: 31.34 UIU/ML (ref 0.44–3.86)
WBC # BLD: 4.7 K/UL (ref 4.8–10.8)

## 2022-08-30 PROCEDURE — 71046 X-RAY EXAM CHEST 2 VIEWS: CPT

## 2022-08-30 PROCEDURE — 84443 ASSAY THYROID STIM HORMONE: CPT

## 2022-08-30 PROCEDURE — 99213 OFFICE O/P EST LOW 20 MIN: CPT | Performed by: FAMILY MEDICINE

## 2022-08-30 PROCEDURE — 84439 ASSAY OF FREE THYROXINE: CPT

## 2022-08-30 PROCEDURE — 80053 COMPREHEN METABOLIC PANEL: CPT

## 2022-08-30 PROCEDURE — 36415 COLL VENOUS BLD VENIPUNCTURE: CPT

## 2022-08-30 PROCEDURE — 1123F ACP DISCUSS/DSCN MKR DOCD: CPT | Performed by: FAMILY MEDICINE

## 2022-08-30 PROCEDURE — 85025 COMPLETE CBC W/AUTO DIFF WBC: CPT

## 2022-08-30 RX ORDER — AZITHROMYCIN 250 MG/1
TABLET, FILM COATED ORAL
Qty: 6 TABLET | Refills: 0 | Status: SHIPPED | OUTPATIENT
Start: 2022-08-30

## 2022-08-30 NOTE — PROGRESS NOTES
Subjective:      Patient ID: Jovanni Jasmine is a 80 y.o. male who presents today for:     Chief Complaint   Patient presents with    Cough     X few months getting worse        HPI  Patient is a very pleasant 66-year-old male presents today with his wife. He has been having a cough that has been present for approximately 2 to 3 months. It is occasionally productive, typically of clear sputum. He denies any fever, chills or shortness of breath. She states that he has had a similar cough in the past which has responded to azithromycin  Past Medical History:   Diagnosis Date    CHF (congestive heart failure) (HCC)     Stage 3b chronic kidney disease ([facility] 3/23/2022     Past Surgical History:   Procedure Laterality Date    COLONOSCOPY N/A 5/1/2020    COLONOSCOPY ROOM 175 performed by Lorie Vences MD at [address]       History reviewed. No pertinent family history.   Social History     Socioeconomic History    Marital status:      Spouse name: Not on file    Number of children: Not on file    Years of education: Not on file    Highest education level: Not on file   Occupational History    Not on file   Tobacco Use    Smoking status: Never    Smokeless tobacco: Never   Substance and Sexual Activity    Alcohol use: Never    Drug use: Not on file    Sexual activity: Not on file   Other Topics Concern    Not on file   Social History Narrative    Not on file     Social Determinants of Health     Financial Resource Strain: Low Risk     Difficulty of Paying Living Expenses: Not hard at all   Food Insecurity: No Food Insecurity    Worried About Running Out of Food in the Last Year: Never true    Ran Out of Food in the Last Year: Never true   Transportation Needs: Not on file   Physical Activity: Inactive    Days of Exercise per Week: 0 days    Minutes of Exercise per Session: 0 min   Stress: Not on file   Social Connections: Not on file   Intimate Partner Violence: Not on file   Housing tenderness. Abdominal:      General: Abdomen is flat. Bowel sounds are normal.      Palpations: Abdomen is soft. Tenderness: There is no abdominal tenderness. Musculoskeletal:      Cervical back: Normal range of motion. Skin:     General: Skin is warm and dry. Neurological:      Mental Status: He is alert and oriented to person, place, and time. Psychiatric:         Behavior: Behavior normal.         Thought Content: Thought content normal.         Judgment: Judgment normal.       Assessment & Plan:     1. Cough  In the duration of the cough I will order an x-ray for further evaluation. We will also place patient on azithromycin as he has tolerated this medication before and monitor for improvement  - XR CHEST STANDARD (2 VW); Future  - azithromycin (ZITHROMAX) 250 MG tablet; Take 2 tablets p.o. q. daily Day 1, then 1 tablet p.o. q. daily Days 2-5  Dispense: 6 tablet; Refill: 0    Return if symptoms worsen or fail to improve.     David Engle MD

## 2022-08-31 DIAGNOSIS — D64.9 ANEMIA, UNSPECIFIED TYPE: ICD-10-CM

## 2022-08-31 DIAGNOSIS — E03.9 HYPOTHYROIDISM, UNSPECIFIED TYPE: Primary | ICD-10-CM

## 2022-08-31 RX ORDER — LEVOTHYROXINE SODIUM 0.12 MG/1
125 TABLET ORAL DAILY
Qty: 30 TABLET | Refills: 1 | Status: SHIPPED | OUTPATIENT
Start: 2022-08-31

## 2022-09-01 ASSESSMENT — ENCOUNTER SYMPTOMS
WHEEZING: 0
CONSTIPATION: 0
STRIDOR: 0
ABDOMINAL PAIN: 0
NAUSEA: 0
DIARRHEA: 0
SHORTNESS OF BREATH: 0
CHEST TIGHTNESS: 0
COUGH: 1
BLOOD IN STOOL: 0
VOMITING: 0
APNEA: 0

## 2022-09-07 ENCOUNTER — TELEPHONE (OUTPATIENT)
Dept: FAMILY MEDICINE CLINIC | Age: 87
End: 2022-09-07

## 2022-09-07 NOTE — TELEPHONE ENCOUNTER
----- Message from Eric Frederick sent at 9/7/2022 11:55 AM EDT -----  Subject: Message to Provider    QUESTIONS  Information for Provider? angi Anderson returning missed call from the   practice  ---------------------------------------------------------------------------  --------------  [id number] [employer]  [phone]; OK to leave message on voicemail  ---------------------------------------------------------------------------  --------------  SCRIPT ANSWERS  undefined

## 2022-10-05 ENCOUNTER — HOSPITAL ENCOUNTER (OUTPATIENT)
Dept: LAB | Age: 87
Discharge: HOME | End: 2022-10-05
Payer: MEDICARE

## 2022-10-05 PROCEDURE — 82607 VITAMIN B-12: CPT

## 2022-10-05 PROCEDURE — 82306 VITAMIN D 25 HYDROXY: CPT

## 2022-10-06 LAB
VITAMIN B-12: 418 PG/ML (ref 232–1245)
VITAMIN D 25-HYDROXY: 17.4 NG/ML

## 2022-10-28 ENCOUNTER — OFFICE VISIT (OUTPATIENT)
Dept: CARDIOLOGY CLINIC | Age: 87
End: 2022-10-28
Payer: MEDICARE

## 2022-10-28 VITALS
BODY MASS INDEX: 27.83 KG/M2 | WEIGHT: 205.2 LBS | DIASTOLIC BLOOD PRESSURE: 82 MMHG | HEART RATE: 64 BPM | SYSTOLIC BLOOD PRESSURE: 128 MMHG

## 2022-10-28 DIAGNOSIS — I50.9 CONGESTIVE HEART FAILURE, UNSPECIFIED HF CHRONICITY, UNSPECIFIED HEART FAILURE TYPE (HCC): Primary | ICD-10-CM

## 2022-10-28 DIAGNOSIS — R94.31 ABNORMAL ECG: ICD-10-CM

## 2022-10-28 DIAGNOSIS — I44.7 LBBB (LEFT BUNDLE BRANCH BLOCK): ICD-10-CM

## 2022-10-28 DIAGNOSIS — R06.09 DOE (DYSPNEA ON EXERTION): ICD-10-CM

## 2022-10-28 PROCEDURE — 99214 OFFICE O/P EST MOD 30 MIN: CPT | Performed by: INTERNAL MEDICINE

## 2022-10-28 PROCEDURE — 1123F ACP DISCUSS/DSCN MKR DOCD: CPT | Performed by: INTERNAL MEDICINE

## 2022-10-28 PROCEDURE — 93000 ELECTROCARDIOGRAM COMPLETE: CPT | Performed by: INTERNAL MEDICINE

## 2022-10-28 ASSESSMENT — ENCOUNTER SYMPTOMS
NAUSEA: 0
RESPIRATORY NEGATIVE: 1
SHORTNESS OF BREATH: 0
GASTROINTESTINAL NEGATIVE: 1
WHEEZING: 0
CHEST TIGHTNESS: 0
STRIDOR: 0
EYES NEGATIVE: 1
COUGH: 0
BLOOD IN STOOL: 0

## 2022-10-28 NOTE — PROGRESS NOTES
OFFICE VISIT        Patient: Man Ortega  YOB: 1930  MRN: [id number]    Chief Complaint: CMP CKD TIA  Chief Complaint   Patient presents with    6 Month Follow-Up    Congestive Heart Failure       CV Data:  4/20 Echo EF 45     Subjective/HPI  Referred for CHF.   4/2020 Echo EF 45%. Pt denies any complaints but wife states he has slowed down al lot. Pt states not to everything. 10/28/22 no cp no falls no bleed has BURNETT. No palps his Neuro thught he was in AF during her exam. SR today      EKG: SR 53 LBBB  nonsnmoker  No ETOH  Lives with wife  Retired-     Past Medical History:   Diagnosis Date    CHF (congestive heart failure) ([facility]     Stage 3b chronic kidney disease ([facility] 3/23/2022       Past Surgical History:   Procedure Laterality Date    COLONOSCOPY N/A 5/1/2020    COLONOSCOPY ROOM 175 performed by Ravi Paula MD at [address]         No family history on file.     Social History     Socioeconomic History    Marital status:    Tobacco Use    Smoking status: Never    Smokeless tobacco: Never   Substance and Sexual Activity    Alcohol use: Never     Social Determinants of Health     Financial Resource Strain: Low Risk     Difficulty of Paying Living Expenses: Not hard at all   Food Insecurity: No Food Insecurity    Worried About Running Out of Food in the Last Year: Never true    Ran Out of Food in the Last Year: Never true   Physical Activity: Inactive    Days of Exercise per Week: 0 days    Minutes of Exercise per Session: 0 min       Allergies   Allergen Reactions    Baclofen        Current Outpatient Medications   Medication Sig Dispense Refill    Cholecalciferol (VITAMIN D3) 125 MCG (5000 UT) TABS take 1 tablet by mouth daily      levothyroxine (SYNTHROID) 125 MCG tablet Take 1 tablet by mouth daily 30 tablet 1    azithromycin (ZITHROMAX) 250 MG tablet Take 2 tablets p.o. q. daily Day 1, then 1 tablet p.o. q. daily Days 2-5 6 tablet 0 ferrous sulfate (IRON 325) 325 (65 Fe) MG tablet Take 1 tablet by mouth 2 times daily 60 tablet 1    aspirin 81 MG EC tablet Take 81 mg by mouth daily      latanoprost (XALATAN) 0.005 % ophthalmic solution        No current facility-administered medications for this visit. Review of Systems:   Review of Systems   Constitutional: Negative. Negative for diaphoresis and fatigue. HENT: Negative. Eyes: Negative. Respiratory: Negative. Negative for cough, chest tightness, shortness of breath, wheezing and stridor. Cardiovascular: Negative. Negative for chest pain, palpitations and leg swelling. Gastrointestinal: Negative. Negative for blood in stool and nausea. Genitourinary: Negative. Musculoskeletal: Negative. Skin: Negative. Neurological: Negative. Negative for dizziness, syncope, weakness and light-headedness. Hematological: Negative. Psychiatric/Behavioral: Negative. Physical Examination:    /82 (Site: Right Upper Arm, Position: Sitting, Cuff Size: Large Adult)   Pulse 64   Wt 205 lb 3.2 oz (93.1 kg)   BMI 27.83 kg/m²    Physical Exam   Constitutional: He appears healthy. No distress. HENT:   Normal cephalic and Atraumatic   Eyes: Pupils are equal, round, and reactive to light. Neck: Thyroid normal. No JVD present. No neck adenopathy. No thyromegaly present. Cardiovascular: Normal rate, regular rhythm, normal heart sounds, intact distal pulses and normal pulses. Pulmonary/Chest: Effort normal and breath sounds normal. He has no wheezes. He has no rales. He exhibits no tenderness. Abdominal: Soft. Bowel sounds are normal. There is no abdominal tenderness. Musculoskeletal:         General: No tenderness or edema. Normal range of motion. Cervical back: Normal range of motion and neck supple. Neurological: He is alert and oriented to person, place, and time. Skin: Skin is warm. No cyanosis. Nails show no clubbing.      LABS:  CBC:   Lab Results Component Value Date/Time    WBC 4.7 08/30/2022 10:30 AM    RBC 4.09 08/30/2022 10:30 AM    HGB 13.1 08/30/2022 10:30 AM    HCT 40.9 08/30/2022 10:30 AM    .1 08/30/2022 10:30 AM    MCH 32.1 08/30/2022 10:30 AM    MCHC 32.0 08/30/2022 10:30 AM    RDW 15.1 08/30/2022 10:30 AM     08/30/2022 10:30 AM     Lipids:  Lab Results   Component Value Date    CHOL 217 (H) 02/17/2020     Lab Results   Component Value Date    TRIG 204 (H) 02/17/2020     Lab Results   Component Value Date    HDL 44 02/17/2020     Lab Results   Component Value Date    LDLCALC 132 (H) 02/17/2020     No results found for: LABVLDL, VLDL  No results found for: CHOLHDLRATIO  CMP:    Lab Results   Component Value Date/Time     08/30/2022 10:30 AM    K 3.7 08/30/2022 10:30 AM     08/30/2022 10:30 AM    CO2 26 08/30/2022 10:30 AM    BUN 19 08/30/2022 10:30 AM    CREATININE 1.78 08/30/2022 10:30 AM    GFRAA 43.4 08/30/2022 10:30 AM    LABGLOM 35.9 08/30/2022 10:30 AM    GLUCOSE 91 08/30/2022 10:30 AM    PROT 7.1 08/30/2022 10:30 AM    LABALBU 3.8 08/30/2022 10:30 AM    CALCIUM 8.8 08/30/2022 10:30 AM    BILITOT 0.4 08/30/2022 10:30 AM    ALKPHOS 55 08/30/2022 10:30 AM    AST 15 08/30/2022 10:30 AM    ALT <5 08/30/2022 10:30 AM     BMP:    Lab Results   Component Value Date/Time     08/30/2022 10:30 AM    K 3.7 08/30/2022 10:30 AM     08/30/2022 10:30 AM    CO2 26 08/30/2022 10:30 AM    BUN 19 08/30/2022 10:30 AM    LABALBU 3.8 08/30/2022 10:30 AM    CREATININE 1.78 08/30/2022 10:30 AM    CALCIUM 8.8 08/30/2022 10:30 AM    GFRAA 43.4 08/30/2022 10:30 AM    LABGLOM 35.9 08/30/2022 10:30 AM    GLUCOSE 91 08/30/2022 10:30 AM     Magnesium:    Lab Results   Component Value Date/Time    MG 2.1 05/01/2020 05:59 AM     TSH:  Lab Results   Component Value Date    TSH 31.340 (H) 08/30/2022             Patient Active Problem List   Diagnosis    GI bleed    Hypothyroidism    Benign prostatic hyperplasia    Stage 3b chronic kidney disease ([facility]       There are no discontinued medications. Modified Medications    No medications on file       No orders of the defined types were placed in this encounter. Assessment/Plan:    1. Chronic heart failure, unspecified heart failure type ([facility]  Pt is compensated with no complaints. He max continue low dsoe asa. Low salt diet. - EKG 12 Lead    2. TIA (transient ischemic attack)  Asa    3. ? AF - HM    4. BURNETT - SPECT        Counseling:  Heart Healthy Lifestyle, Low Salt Diet, Take Precautions to Prevent Falls and Walk Daily    Return for AFTER TESTS.     Electronically signed by Ayana Rosales MD on 10/28/2022 at 1:38 PM

## 2022-11-08 DIAGNOSIS — D64.9 ANEMIA, UNSPECIFIED TYPE: ICD-10-CM

## 2022-11-08 NOTE — TELEPHONE ENCOUNTER
Pharmacy is requesting medication refill.  Please approve or deny this request.    Rx requested:  Requested Prescriptions     Pending Prescriptions Disp Refills    ferrous sulfate (FEROSUL) 325 (65 Fe) MG tablet [Pharmacy Med Name: FEROSUL 325 MG TABLET] 60 tablet 1     Sig: Take 1 tablet by mouth 3 times daily (with meals)         Last Office Visit:   8/30/2022      Next Visit Date:  Future Appointments   Date Time Provider Megan Jasso   12/5/2022  7:00 AM [place] [facility] INJECTION ROOM 2 [facility] MED [facility] Fac RAD   12/5/2022  8:00 AM [place] NUCLEAR MEDICINE ROOM 2 [facility] NUC MED [facility] Fac RAD   12/5/2022  8:30 AM [facility] STRESS ROOM 1 [facility] STRESS [facility] Fac RAD   12/5/2022 10:00 AM [place] NUCLEAR MEDICINE ROOM 2 [facility] NUC MED [facility] Fac RAD   12/5/2022  1:00 PM [facility] EKG  [facility]

## 2022-11-15 RX ORDER — FERROUS SULFATE 325(65) MG
325 TABLET ORAL 2 TIMES DAILY WITH MEALS
Qty: 60 TABLET | Refills: 1 | Status: SHIPPED | OUTPATIENT
Start: 2022-11-15

## 2022-12-05 ENCOUNTER — HOSPITAL ENCOUNTER (OUTPATIENT)
Dept: NON INVASIVE DIAGNOSTICS | Age: 87
Discharge: HOME | End: 2022-12-05

## 2023-02-23 ENCOUNTER — TELEPHONE (OUTPATIENT)
Dept: FAMILY MEDICINE CLINIC | Age: 88
End: 2023-02-23

## 2023-02-27 ENCOUNTER — TELEPHONE (OUTPATIENT)
Dept: FAMILY MEDICINE CLINIC | Age: 88
End: 2023-02-27

## 2023-02-27 NOTE — TELEPHONE ENCOUNTER
----- Message from Ismael Mendez sent at 2/21/2023 10:38 AM EST -----  Subject: Message to Provider    QUESTIONS  Information for Provider? Pt wife Brandy Valerio) would like to have a letter   from PCP stating that pt needs home health care. Will need this in order   to receive home health. Please contact with any questions, and when letter   is ready  ---------------------------------------------------------------------------  --------------  [address] [facility]  [phone]; OK to leave message on voicemail  ---------------------------------------------------------------------------  --------------  SCRIPT ANSWERS  Relationship to Patient? Spouse/Partner  Representative Name? Fam Hodge   Is the Representative on the appropriate HIPAA document in Epic?  Yes

## 2023-03-08 ENCOUNTER — OFFICE VISIT (OUTPATIENT)
Dept: FAMILY MEDICINE CLINIC | Age: 88
End: 2023-03-08

## 2023-03-08 VITALS
TEMPERATURE: 97.8 F | OXYGEN SATURATION: 99 % | BODY MASS INDEX: 28.23 KG/M2 | SYSTOLIC BLOOD PRESSURE: 110 MMHG | WEIGHT: 208.4 LBS | HEART RATE: 66 BPM | HEIGHT: 72 IN | DIASTOLIC BLOOD PRESSURE: 60 MMHG

## 2023-03-08 DIAGNOSIS — I50.9 CONGESTIVE HEART FAILURE, UNSPECIFIED HF CHRONICITY, UNSPECIFIED HEART FAILURE TYPE (HCC): ICD-10-CM

## 2023-03-08 DIAGNOSIS — N18.30 STAGE 3 CHRONIC KIDNEY DISEASE, UNSPECIFIED WHETHER STAGE 3A OR 3B CKD (HCC): ICD-10-CM

## 2023-03-08 DIAGNOSIS — E03.9 HYPOTHYROIDISM, UNSPECIFIED TYPE: ICD-10-CM

## 2023-03-08 DIAGNOSIS — G30.1 MODERATE LATE ONSET ALZHEIMER'S DEMENTIA WITHOUT BEHAVIORAL DISTURBANCE, PSYCHOTIC DISTURBANCE, MOOD DISTURBANCE, OR ANXIETY (HCC): Primary | ICD-10-CM

## 2023-03-08 DIAGNOSIS — F02.B0 MODERATE LATE ONSET ALZHEIMER'S DEMENTIA WITHOUT BEHAVIORAL DISTURBANCE, PSYCHOTIC DISTURBANCE, MOOD DISTURBANCE, OR ANXIETY (HCC): Primary | ICD-10-CM

## 2023-03-08 DIAGNOSIS — I50.22 CHRONIC SYSTOLIC (CONGESTIVE) HEART FAILURE (HCC): ICD-10-CM

## 2023-03-08 SDOH — ECONOMIC STABILITY: INCOME INSECURITY: HOW HARD IS IT FOR YOU TO PAY FOR THE VERY BASICS LIKE FOOD, HOUSING, MEDICAL CARE, AND HEATING?: NOT HARD AT ALL

## 2023-03-08 SDOH — ECONOMIC STABILITY: FOOD INSECURITY: WITHIN THE PAST 12 MONTHS, YOU WORRIED THAT YOUR FOOD WOULD RUN OUT BEFORE YOU GOT MONEY TO BUY MORE.: NEVER TRUE

## 2023-03-08 SDOH — ECONOMIC STABILITY: FOOD INSECURITY: WITHIN THE PAST 12 MONTHS, THE FOOD YOU BOUGHT JUST DIDN'T LAST AND YOU DIDN'T HAVE MONEY TO GET MORE.: NEVER TRUE

## 2023-03-08 SDOH — ECONOMIC STABILITY: HOUSING INSECURITY
IN THE LAST 12 MONTHS, WAS THERE A TIME WHEN YOU DID NOT HAVE A STEADY PLACE TO SLEEP OR SLEPT IN A SHELTER (INCLUDING NOW)?: NO

## 2023-03-08 ASSESSMENT — PATIENT HEALTH QUESTIONNAIRE - PHQ9
4. FEELING TIRED OR HAVING LITTLE ENERGY: 0
SUM OF ALL RESPONSES TO PHQ QUESTIONS 1-9: 0
8. MOVING OR SPEAKING SO SLOWLY THAT OTHER PEOPLE COULD HAVE NOTICED. OR THE OPPOSITE, BEING SO FIGETY OR RESTLESS THAT YOU HAVE BEEN MOVING AROUND A LOT MORE THAN USUAL: 0
1. LITTLE INTEREST OR PLEASURE IN DOING THINGS: 0
6. FEELING BAD ABOUT YOURSELF - OR THAT YOU ARE A FAILURE OR HAVE LET YOURSELF OR YOUR FAMILY DOWN: 0
7. TROUBLE CONCENTRATING ON THINGS, SUCH AS READING THE NEWSPAPER OR WATCHING TELEVISION: 0
3. TROUBLE FALLING OR STAYING ASLEEP: 0
SUM OF ALL RESPONSES TO PHQ QUESTIONS 1-9: 0
SUM OF ALL RESPONSES TO PHQ9 QUESTIONS 1 & 2: 0
10. IF YOU CHECKED OFF ANY PROBLEMS, HOW DIFFICULT HAVE THESE PROBLEMS MADE IT FOR YOU TO DO YOUR WORK, TAKE CARE OF THINGS AT HOME, OR GET ALONG WITH OTHER PEOPLE: 0
5. POOR APPETITE OR OVEREATING: 0
SUM OF ALL RESPONSES TO PHQ QUESTIONS 1-9: 0
2. FEELING DOWN, DEPRESSED OR HOPELESS: 0
SUM OF ALL RESPONSES TO PHQ QUESTIONS 1-9: 0
9. THOUGHTS THAT YOU WOULD BE BETTER OFF DEAD, OR OF HURTING YOURSELF: 0

## 2023-03-08 ASSESSMENT — LIFESTYLE VARIABLES
HOW MANY STANDARD DRINKS CONTAINING ALCOHOL DO YOU HAVE ON A TYPICAL DAY: PATIENT DOES NOT DRINK
HOW OFTEN DO YOU HAVE A DRINK CONTAINING ALCOHOL: NEVER

## 2023-03-08 NOTE — PROGRESS NOTES
Subjective:      Patient ID: Josselin Knutson is a 80 y.o. male who presents today for:   No chief complaint on file. HPI  Josselin Knutson is a very pleasant 80year-old who presents today with his wife to follow-up. He has a history of dementia and is in the care of neurology. She would like to explore the option of home health aides as patient has significant limitations at home  Past Medical History:   Diagnosis Date    CHF (congestive heart failure) ([facility]     Stage [facility] ([facility] 3/23/2022     Past Surgical History:   Procedure Laterality Date    COLONOSCOPY N/A 5/1/2020    COLONOSCOPY ROOM 175 performed by Santos Mitchell MD at [address]       History reviewed. No pertinent family history. Social History     Socioeconomic History    Marital status:      Spouse name: Not on file    Number of children: Not on file    Years of education: Not on file    Highest education level: Not on file   Occupational History    Not on file   Tobacco Use    Smoking status: Never    Smokeless tobacco: Never   Substance and Sexual Activity    Alcohol use: Never    Drug use: Not on file    Sexual activity: Not on file   Other Topics Concern    Not on file   Social History Narrative    Not on file     Social Determinants of Health     Financial Resource Strain: Low Risk     Difficulty of Paying Living Expenses: Not hard at all   Food Insecurity: No Food Insecurity    Worried About Running Out of Food in the Last Year: Never true    [address] in the Last Year: Never true   Transportation Needs: Unknown    Lack of Transportation (Medical): Not on file    Lack of Transportation (Non-Medical):  No   Physical Activity: Inactive    Days of Exercise per Week: 0 days    Minutes of Exercise per Session: 0 min   Stress: Not on file   Social Connections: Not on file   Intimate Partner Violence: Not on file   Housing Stability: Unknown    Unable to Pay for Housing in the Last Year: Not on file    Number of Places Lived in the Last Year: Not on file    Unstable Housing in the Last Year: No     Current Outpatient Medications on File Prior to Visit   Medication Sig Dispense Refill    ferrous sulfate (FEROSUL) 325 (65 Fe) MG tablet Take 1 tablet by mouth 2 times daily (with meals) 60 tablet 1    Cholecalciferol (VITAMIN D3) 125 MCG (5000 UT) TABS take 1 tablet by mouth daily      aspirin 81 MG EC tablet Take 81 mg by mouth daily      latanoprost (XALATAN) 0.005 % ophthalmic solution       azithromycin (ZITHROMAX) 250 MG tablet Take 2 tablets p.o. q. daily Day 1, then 1 tablet p.o. q. daily Days 2-5 (Patient not taking: Reported on 3/8/2023) 6 tablet 0     No current facility-administered medications on file prior to visit. Allergies:  Baclofen    Review of Systems   Constitutional:  Negative for activity change, appetite change and fatigue. Respiratory:  Negative for apnea, cough, chest tightness and shortness of breath. Cardiovascular:  Negative for chest pain, palpitations and leg swelling. Gastrointestinal:  Negative for abdominal pain, blood in stool, constipation, diarrhea, nausea and vomiting. Musculoskeletal:  Negative for arthralgias. Neurological:  Negative for seizures and headaches. Memory impariment   Psychiatric/Behavioral:  Negative for hallucinations and suicidal ideas. Objective:   /60   Pulse 66   Temp 97.8 °F (36.6 °C) (Infrared)   Ht 6' (1.829 m)   Wt 208 lb 6.4 oz (94.5 kg)   SpO2 99%   BMI 28.26 kg/m²     Physical Exam  Vitals and nursing note reviewed. Constitutional:       General: He is not in acute distress. Appearance: Normal appearance. He is well-developed. He is not diaphoretic. HENT:      Head: Normocephalic and atraumatic. Nose: Nose normal.      Mouth/Throat:      Mouth: Mucous membranes are moist.      Pharynx: Oropharynx is clear.    Eyes:      Conjunctiva/sclera: Conjunctivae normal.      Pupils: Pupils are equal, round, and reactive to light. Cardiovascular:      Rate and Rhythm: Normal rate and regular rhythm. Heart sounds: Normal heart sounds. No murmur heard. No friction rub. No gallop. Pulmonary:      Effort: Pulmonary effort is normal. No respiratory distress. Breath sounds: Normal breath sounds. No wheezing or rales. Chest:      Chest wall: No tenderness. Abdominal:      General: Abdomen is flat. Bowel sounds are normal.      Palpations: Abdomen is soft. Tenderness: There is no abdominal tenderness. Musculoskeletal:      Cervical back: Normal range of motion. Skin:     General: Skin is warm and dry. Neurological:      Mental Status: He is alert and oriented to person, place, and time. Psychiatric:         Behavior: Behavior normal.         Thought Content: Thought content normal.         Judgment: Judgment normal.       Assessment & Plan:     1. Moderate late onset Alzheimer's dementia without behavioral disturbance, psychotic disturbance, mood disturbance, or anxiety (HCC)  We will refer to home health for additional assistance  - Amb External Referral To Home Health    2. Congestive heart failure, unspecified HF chronicity, unspecified heart failure type ([facility]  Stable: Continue current medication follow-up with cardiology    3. Stage 3 chronic kidney disease, unspecified whether stage 3a or 3b CKD ([address]  Stable      Return in about 1 month (around 4/8/2023) for AWV.     Taylor Breen MD

## 2023-03-08 NOTE — TELEPHONE ENCOUNTER
Comments:     Last Office Visit (last PCP visit):   8/30/2022    Next Visit Date:  Future Appointments   Date Time Provider Megan Jasso   3/8/2023  1:45 PM [address], MD Ernst Pandey [address]       **If hasn't been seen in over a year OR hasn't followed up according to last diabetes/ADHD visit, make appointment for patient before sending refill to provider.     Rx requested:  Requested Prescriptions     Pending Prescriptions Disp Refills    levothyroxine (SYNTHROID) 125 MCG tablet [Pharmacy Med Name: LEVOTHYROXINE 125 MCG TABLET] 30 tablet 1     Sig: take 1 tablet by mouth once daily

## 2023-03-09 RX ORDER — LEVOTHYROXINE SODIUM 0.12 MG/1
TABLET ORAL
Qty: 30 TABLET | Refills: 1 | Status: SHIPPED | OUTPATIENT
Start: 2023-03-09

## 2023-03-12 ASSESSMENT — ENCOUNTER SYMPTOMS
BLOOD IN STOOL: 0
CONSTIPATION: 0
NAUSEA: 0
COUGH: 0
SHORTNESS OF BREATH: 0
APNEA: 0
ABDOMINAL PAIN: 0
DIARRHEA: 0
VOMITING: 0
CHEST TIGHTNESS: 0

## 2023-03-27 ENCOUNTER — TELEPHONE (OUTPATIENT)
Dept: FAMILY MEDICINE CLINIC | Age: 88
End: 2023-03-27

## 2023-03-27 NOTE — TELEPHONE ENCOUNTER
Olamide Cade Daughter / Manjula Hawley calling and asking about paperwork for Home Health aid. .. At last office visit  3/ 8 pt  and spouse were  told paperwork was  completed. . Daughter calling and would like a copy so she has the agency to contact with.      Please advise   [phone]

## 2023-03-28 NOTE — TELEPHONE ENCOUNTER
Unable to LVM, paperwork not scanned into chart, paperwork is not in PCP box or MA box.  Will discuss further when PCP returns on 4/3

## 2023-04-05 DIAGNOSIS — D64.9 ANEMIA, UNSPECIFIED TYPE: ICD-10-CM

## 2023-04-05 NOTE — TELEPHONE ENCOUNTER
Pharmacy is requesting medication refill.  Please approve or deny this request.    Rx requested:  Requested Prescriptions     Pending Prescriptions Disp Refills    ferrous sulfate (FEROSUL) 325 (65 Fe) MG tablet [Pharmacy Med Name: FEROSUL 325 MG TABLET] 60 tablet 1     Sig: Take 1 tablet by mouth with breakfast and with evening meal         Last Office Visit:   3/8/2023      Next Visit Date:  Future Appointments   Date Time Provider Megan Jasso   4/24/2023  8:00 AM [address], MD Ernst Menon [place] [address]

## 2023-04-06 RX ORDER — FERROUS SULFATE 325(65) MG
325 TABLET ORAL 2 TIMES DAILY WITH MEALS
Qty: 60 TABLET | Refills: 1 | Status: SHIPPED | OUTPATIENT
Start: 2023-04-06

## 2023-05-04 DIAGNOSIS — E03.9 HYPOTHYROIDISM, UNSPECIFIED TYPE: ICD-10-CM

## 2023-05-04 NOTE — TELEPHONE ENCOUNTER
Pharmacy is requesting medication refill. Please approve or deny this request.    Rx requested:  Requested Prescriptions     Pending Prescriptions Disp Refills    levothyroxine (SYNTHROID) 125 MCG tablet [Pharmacy Med Name: LEVOTHYROXINE 125 MCG TABLET] 30 tablet 1     Sig: Take 1 tablet by mouth Daily         Last Office Visit:   3/8/2023      Next Visit Date:  No future appointments.

## 2023-05-05 RX ORDER — LEVOTHYROXINE SODIUM 0.12 MG/1
125 TABLET ORAL DAILY
Qty: 30 TABLET | Refills: 1 | Status: SHIPPED | OUTPATIENT
Start: 2023-05-05

## 2023-06-05 ENCOUNTER — OFFICE VISIT (OUTPATIENT)
Dept: FAMILY MEDICINE CLINIC | Age: 88
End: 2023-06-05
Payer: MEDICARE

## 2023-06-05 ENCOUNTER — TELEPHONE (OUTPATIENT)
Dept: FAMILY MEDICINE CLINIC | Age: 88
End: 2023-06-05

## 2023-06-05 VITALS
BODY MASS INDEX: 27.12 KG/M2 | OXYGEN SATURATION: 99 % | HEART RATE: 57 BPM | WEIGHT: 200 LBS | TEMPERATURE: 98 F | SYSTOLIC BLOOD PRESSURE: 130 MMHG | DIASTOLIC BLOOD PRESSURE: 60 MMHG

## 2023-06-05 DIAGNOSIS — G30.1 MODERATE LATE ONSET ALZHEIMER'S DEMENTIA WITHOUT BEHAVIORAL DISTURBANCE, PSYCHOTIC DISTURBANCE, MOOD DISTURBANCE, OR ANXIETY (HCC): ICD-10-CM

## 2023-06-05 DIAGNOSIS — G47.00 INSOMNIA, UNSPECIFIED TYPE: ICD-10-CM

## 2023-06-05 DIAGNOSIS — F02.B0 MODERATE LATE ONSET ALZHEIMER'S DEMENTIA WITHOUT BEHAVIORAL DISTURBANCE, PSYCHOTIC DISTURBANCE, MOOD DISTURBANCE, OR ANXIETY (HCC): ICD-10-CM

## 2023-06-05 DIAGNOSIS — I50.9 CONGESTIVE HEART FAILURE, UNSPECIFIED HF CHRONICITY, UNSPECIFIED HEART FAILURE TYPE (HCC): Primary | ICD-10-CM

## 2023-06-05 PROCEDURE — 99214 OFFICE O/P EST MOD 30 MIN: CPT | Performed by: FAMILY MEDICINE

## 2023-06-05 PROCEDURE — 1123F ACP DISCUSS/DSCN MKR DOCD: CPT | Performed by: FAMILY MEDICINE

## 2023-06-05 RX ORDER — HYDRALAZINE HYDROCHLORIDE 25 MG/1
25 TABLET, FILM COATED ORAL EVERY 12 HOURS
COMMUNITY
Start: 2023-03-11 | End: 2023-06-05 | Stop reason: SDUPTHER

## 2023-06-05 RX ORDER — ISOSORBIDE MONONITRATE 30 MG/1
30 TABLET, EXTENDED RELEASE ORAL DAILY
Qty: 30 TABLET | Refills: 0 | Status: SHIPPED | OUTPATIENT
Start: 2023-06-05

## 2023-06-05 RX ORDER — ATORVASTATIN CALCIUM 40 MG/1
40 TABLET, FILM COATED ORAL NIGHTLY
COMMUNITY
Start: 2023-03-11 | End: 2023-06-05 | Stop reason: SDUPTHER

## 2023-06-05 RX ORDER — LANOLIN ALCOHOL/MO/W.PET/CERES
3 CREAM (GRAM) TOPICAL DAILY
Qty: 30 TABLET | Refills: 0 | Status: SHIPPED | OUTPATIENT
Start: 2023-06-05

## 2023-06-05 RX ORDER — ATORVASTATIN CALCIUM 40 MG/1
40 TABLET, FILM COATED ORAL NIGHTLY
Qty: 30 TABLET | Refills: 1 | Status: SHIPPED | OUTPATIENT
Start: 2023-06-05

## 2023-06-05 RX ORDER — HYDRALAZINE HYDROCHLORIDE 25 MG/1
25 TABLET, FILM COATED ORAL EVERY 12 HOURS
Qty: 60 TABLET | Refills: 0 | Status: SHIPPED | OUTPATIENT
Start: 2023-06-05

## 2023-06-05 RX ORDER — ISOSORBIDE MONONITRATE 30 MG/1
1 TABLET, EXTENDED RELEASE ORAL DAILY
COMMUNITY
End: 2023-06-05 | Stop reason: SDUPTHER

## 2023-06-05 NOTE — PROGRESS NOTES
will also confer with neurology  - melatonin 3 MG TABS tablet; Take 1 tablet by mouth daily  Dispense: 30 tablet; Refill: 0    3. Moderate late onset Alzheimer's dementia without behavioral disturbance, psychotic disturbance, mood disturbance, or anxiety ([facility]  Believe patient would benefit from home health  - Ambulatory referral to [address] [place] [address] Daniel Costello      Return for AWV.     Ct Courtney MD

## 2023-06-05 NOTE — TELEPHONE ENCOUNTER
William Salazar calling from [facility] regarding the  referral she received today, they do not provide home care aide William Salazar suggested  you may want to reach out to [address] [facility] or [facility] for an aide for pt     [phone]

## 2023-06-08 PROBLEM — G47.00 INSOMNIA: Status: ACTIVE | Noted: 2023-06-08

## 2023-06-08 ASSESSMENT — ENCOUNTER SYMPTOMS
APNEA: 0
COUGH: 0
SHORTNESS OF BREATH: 0
VOMITING: 0
BLOOD IN STOOL: 0
NAUSEA: 0
ABDOMINAL PAIN: 0
CHEST TIGHTNESS: 0
CONSTIPATION: 0
DIARRHEA: 0

## 2023-07-13 ENCOUNTER — TELEPHONE (OUTPATIENT)
Dept: FAMILY MEDICINE CLINIC | Age: 88
End: 2023-07-13

## 2023-07-18 ENCOUNTER — TELEPHONE (OUTPATIENT)
Dept: FAMILY MEDICINE CLINIC | Age: 88
End: 2023-07-18

## 2023-07-18 ENCOUNTER — CARE COORDINATION (OUTPATIENT)
Dept: CARE COORDINATION | Age: 88
End: 2023-07-18

## 2023-07-18 NOTE — TELEPHONE ENCOUNTER
I left a message for daughter Johny Narayanan to update on status of home health aid referral. A message was sent to TONNY Benavides to follow up with the family regarding resources.

## 2023-07-18 NOTE — CARE COORDINATION
Case referred to this writer by Mike Morrell MA /Dr. Selwyn Ellington Office to assist daughter/Naida in coordination of  HHA for patient. Telephone call to [facility]. Left voicemail of reason for call with request for return phone call. Call back number was provided.

## 2023-07-19 ENCOUNTER — CARE COORDINATION (OUTPATIENT)
Dept: CARE COORDINATION | Age: 88
End: 2023-07-19

## 2023-07-19 NOTE — CARE COORDINATION
Telephone call to [address] [facility]. Left voicemail of purpose of call with request for return phone call. Call back number was provided.

## 2023-07-21 ENCOUNTER — CARE COORDINATION (OUTPATIENT)
Dept: CARE COORDINATION | Age: 88
End: 2023-07-21

## 2023-07-21 NOTE — CARE COORDINATION
Telephone call with Ronnie Ken. She discussed need for HHA assistance for patient who has Alzheimer's. Spouse is with patient but is in need of support in caring for patient. Discussed that Medicare does not cover HHA. Discussed [address] [facility] and patient would need to be eligible for Medicaid. Daughter is in favor of a PAS[employer]ORT referral being completed. Also discussed option of private hire agency if [address] does not work out. Completed [employer] referral on line and received confirmation it was received. Also discussed that patient is a [place]. Provided daughter with contact information [facility]. Recommended that she speak with  to see if patient would be eligible for any services. He is not connected to the [address] [place] [address] at this time.

## 2023-07-24 DIAGNOSIS — I50.9 CONGESTIVE HEART FAILURE, UNSPECIFIED HF CHRONICITY, UNSPECIFIED HEART FAILURE TYPE (HCC): ICD-10-CM

## 2023-07-24 RX ORDER — HYDRALAZINE HYDROCHLORIDE 25 MG/1
25 TABLET, FILM COATED ORAL EVERY 12 HOURS
Qty: 180 TABLET | Refills: 0 | Status: SHIPPED | OUTPATIENT
Start: 2023-07-24

## 2023-07-24 NOTE — TELEPHONE ENCOUNTER
Pt's wife requesting refills for hydrALAZINE (APRESOLINE) 25 MG tablet. Pt has 0 days left. Please advise.     LOV: 6/5/2023  Future Appts: 8/1/2023    Ph: [phone]

## 2023-07-24 NOTE — TELEPHONE ENCOUNTER
Pt is requesting medication refill. Please approve or deny this request.    Rx requested:  Requested Prescriptions     Pending Prescriptions Disp Refills    hydrALAZINE (APRESOLINE) 25 MG tablet 60 tablet 0     Sig: Take 1 tablet by mouth in the morning and 1 tablet in the evening.          Last Office Visit:   6/5/2023      Next Visit Date:  Future Appointments   Date Time Provider [address]  [facility]   8/1/2023  1:45 PM Meng Mack MD [address]

## 2023-07-28 ENCOUNTER — CARE COORDINATION (OUTPATIENT)
Dept: CARE COORDINATION | Age: 88
End: 2023-07-28

## 2023-07-28 NOTE — CARE COORDINATION
Telephone call with Giuseppe Rubio. She has not heard from [facility] yet. Discussed plan to give it another week and do not hear anything can give them a call.

## 2023-08-01 ENCOUNTER — OFFICE VISIT (OUTPATIENT)
Dept: FAMILY MEDICINE CLINIC | Age: 88
End: 2023-08-01
Payer: MEDICARE

## 2023-08-01 ENCOUNTER — HOSPITAL ENCOUNTER (OUTPATIENT)
Dept: LAB | Age: 88
Discharge: HOME | End: 2023-08-01
Payer: MEDICARE

## 2023-08-01 VITALS
HEART RATE: 65 BPM | DIASTOLIC BLOOD PRESSURE: 70 MMHG | WEIGHT: 211.4 LBS | BODY MASS INDEX: 28.67 KG/M2 | TEMPERATURE: 97 F | SYSTOLIC BLOOD PRESSURE: 126 MMHG | OXYGEN SATURATION: 99 %

## 2023-08-01 DIAGNOSIS — N18.30 STAGE 3 CHRONIC KIDNEY DISEASE, UNSPECIFIED WHETHER STAGE 3A OR 3B CKD (HCC): ICD-10-CM

## 2023-08-01 DIAGNOSIS — G47.00 INSOMNIA, UNSPECIFIED TYPE: ICD-10-CM

## 2023-08-01 DIAGNOSIS — R60.0 BILATERAL LOWER EXTREMITY EDEMA: Primary | ICD-10-CM

## 2023-08-01 DIAGNOSIS — F02.B0 MODERATE LATE ONSET ALZHEIMER'S DEMENTIA WITHOUT BEHAVIORAL DISTURBANCE, PSYCHOTIC DISTURBANCE, MOOD DISTURBANCE, OR ANXIETY (HCC): ICD-10-CM

## 2023-08-01 DIAGNOSIS — I50.9 CONGESTIVE HEART FAILURE, UNSPECIFIED HF CHRONICITY, UNSPECIFIED HEART FAILURE TYPE (HCC): ICD-10-CM

## 2023-08-01 DIAGNOSIS — G30.1 MODERATE LATE ONSET ALZHEIMER'S DEMENTIA WITHOUT BEHAVIORAL DISTURBANCE, PSYCHOTIC DISTURBANCE, MOOD DISTURBANCE, OR ANXIETY (HCC): ICD-10-CM

## 2023-08-01 LAB
ANION GAP SERPL CALCULATED.3IONS-SCNC: 11 MEQ/L (ref 9–15)
BNP BLD-MCNC: 1251 PG/ML
BUN SERPL-MCNC: 19 MG/DL (ref 8–23)
CALCIUM SERPL-MCNC: 9 MG/DL (ref 8.5–9.9)
CHLORIDE SERPL-SCNC: 103 MEQ/L (ref 95–107)
CO2 SERPL-SCNC: 24 MEQ/L (ref 20–31)
CREAT SERPL-MCNC: 1.75 MG/DL (ref 0.7–1.2)
GLUCOSE SERPL-MCNC: 86 MG/DL (ref 70–99)
POTASSIUM SERPL-SCNC: 3.9 MEQ/L (ref 3.4–4.9)
SODIUM SERPL-SCNC: 138 MEQ/L (ref 135–144)
TROPONIN T SERPL-MCNC: <0.01 NG/ML (ref 0–0.01)

## 2023-08-01 PROCEDURE — 83880 ASSAY OF NATRIURETIC PEPTIDE: CPT

## 2023-08-01 PROCEDURE — 84484 ASSAY OF TROPONIN QUANT: CPT

## 2023-08-01 PROCEDURE — 36415 COLL VENOUS BLD VENIPUNCTURE: CPT

## 2023-08-01 PROCEDURE — 80048 BASIC METABOLIC PNL TOTAL CA: CPT

## 2023-08-01 PROCEDURE — 99214 OFFICE O/P EST MOD 30 MIN: CPT | Performed by: FAMILY MEDICINE

## 2023-08-01 PROCEDURE — 93000 ELECTROCARDIOGRAM COMPLETE: CPT | Performed by: FAMILY MEDICINE

## 2023-08-01 PROCEDURE — 1123F ACP DISCUSS/DSCN MKR DOCD: CPT | Performed by: FAMILY MEDICINE

## 2023-08-01 RX ORDER — LANOLIN ALCOHOL/MO/W.PET/CERES
3 CREAM (GRAM) TOPICAL DAILY
Qty: 30 TABLET | Refills: 0 | Status: SHIPPED | OUTPATIENT
Start: 2023-08-01

## 2023-08-01 RX ORDER — ISOSORBIDE MONONITRATE 30 MG/1
30 TABLET, EXTENDED RELEASE ORAL DAILY
Qty: 30 TABLET | Refills: 0 | Status: SHIPPED | OUTPATIENT
Start: 2023-08-01

## 2023-08-01 ASSESSMENT — ENCOUNTER SYMPTOMS
DIARRHEA: 0
ABDOMINAL PAIN: 0
SHORTNESS OF BREATH: 0
VOMITING: 0
NAUSEA: 0
CONSTIPATION: 0
COUGH: 0
CHEST TIGHTNESS: 0
BLOOD IN STOOL: 0
APNEA: 0

## 2023-08-01 NOTE — PROGRESS NOTES
Kg  Subjective:      Patient ID: Arnulfo Govea is a 80 y.o. male who presents today for:     Chief Complaint   Patient presents with    Edema     B/l leg/foot edema, x2-3 weeks        HPI  Arnulfo Govea is a very pleasant 55-year-old male presents today with his wife due to bilateral lower extremity edema x2 to 3 weeks. He has a history of congestive heart failure with an ejection fraction that decreased from approximately 45% to 32%. He is in the care of cardiology who plan to repeat echocardiogram in June however patient has not been able to follow-up. Cardiology placed him on hydralazine and patient has been adherent to his medication. he states that he has occasional chest discomfort but denies any shortness of breath. Wife states that lower extremity edema was worse and has improved over the past 2 days. Patient has a history of chronic kidney disease as well is limited to certain medications to be used  Past Medical History:   Diagnosis Date    CHF (congestive heart failure) ([facility])     Stage 3b chronic kidney disease ([facility]) 3/23/2022     Past Surgical History:   Procedure Laterality Date    COLONOSCOPY N/A 5/1/2020    COLONOSCOPY ROOM 175 performed by Paul Mendoza MD at [address]       History reviewed. No pertinent family history.   Social History     Socioeconomic History    Marital status:      Spouse name: Not on file    Number of children: Not on file    Years of education: Not on file    Highest education level: Not on file   Occupational History    Not on file   Tobacco Use    Smoking status: Never    Smokeless tobacco: Never   Substance and Sexual Activity    Alcohol use: Never    Drug use: Not on file    Sexual activity: Not on file   Other Topics Concern    Not on file   Social History Narrative    Not on file     Social Determinants of Health     Financial Resource Strain: Low Risk     Difficulty of Paying Living Expenses: Not hard at all   Food Insecurity: No

## 2023-08-02 ENCOUNTER — TELEPHONE (OUTPATIENT)
Dept: FAMILY MEDICINE CLINIC | Age: 88
End: 2023-08-02

## 2023-08-02 RX ORDER — FUROSEMIDE 20 MG/1
10 TABLET ORAL DAILY
Qty: 3 TABLET | Refills: 0 | Status: SHIPPED | OUTPATIENT
Start: 2023-08-02

## 2023-08-02 NOTE — TELEPHONE ENCOUNTER
Spoke to Dr Lynn Cespedes office yesterday in regards to pt having EKG changes and B/L edema, chest pain that comes and goes, faxed EKG from yesterday. Nurse from [facility] stated she would have Dr Elvis Esqueda review EKG as pts cardiologist is out of office and give pt a call with direction if pt should have been evaluated at ED or have an appointment with cardiology. Contacted pt wife for follow up to see if they were given instruction as to if pt needed to F/u with cardio or go to ED, wife states they were never contacted. .. attempted to call cardiology again today was sent to . [facility] for cardiology to give office a call back. Spoke to PCP who advises pt go to ED, if refusal to go to ED will call cardio in the morning to get appt sched. Pt wife states she has her grandson and Is unable to take pt to ED tonight, advised if symptoms worsen tonight to take pt to ED. Will call cardiology first thing in the morning.

## 2023-08-04 ENCOUNTER — CARE COORDINATION (OUTPATIENT)
Dept: CARE COORDINATION | Age: 88
End: 2023-08-04

## 2023-08-04 NOTE — CARE COORDINATION
Telephone call with Babak Alvarez. She indicated [employer] Program is coming out to patient's home on 8/9/2023 to do an assessment.

## 2023-08-14 ENCOUNTER — CARE COORDINATION (OUTPATIENT)
Dept: CARE COORDINATION | Age: 88
End: 2023-08-14

## 2023-08-14 NOTE — CARE COORDINATION
Telephone call with Brianna/daughter. She indicated that her parents met with [facility] but he would have 900 dollars a month liability and they cannot afford that. Discussed that patient is a [place] and she has their contact information to call them.

## 2023-08-23 ENCOUNTER — CARE COORDINATION (OUTPATIENT)
Dept: CARE COORDINATION | Age: 88
End: 2023-08-23

## 2023-08-23 NOTE — CARE COORDINATION
Telephone call with Scout Cano. She stated that she spoke to insurance want was told would cover 30 hour a week for care in the home. Isismalena Teepankaj feels they need information for PCP. She is going to give insurance my phone number to assist with coordination of care in the home. Provided daughter with contact information [facility] for respite. She has not reached out to [place] yet.

## 2023-08-29 ENCOUNTER — CARE COORDINATION (OUTPATIENT)
Dept: CARE COORDINATION | Age: 88
End: 2023-08-29

## 2023-08-29 NOTE — CARE COORDINATION
Telephone call to [address]. She is still trying to work with insurance to see if will cover HHA. She still feels need for HHA assistance. Offered private hire agency list for future reference.

## 2023-09-05 ENCOUNTER — CARE COORDINATION (OUTPATIENT)
Dept: CARE COORDINATION | Age: 88
End: 2023-09-05

## 2023-09-12 ENCOUNTER — OFFICE VISIT (OUTPATIENT)
Dept: FAMILY MEDICINE CLINIC | Age: 88
End: 2023-09-12
Payer: MEDICARE

## 2023-09-12 VITALS
HEART RATE: 62 BPM | OXYGEN SATURATION: 99 % | BODY MASS INDEX: 27.94 KG/M2 | DIASTOLIC BLOOD PRESSURE: 72 MMHG | SYSTOLIC BLOOD PRESSURE: 136 MMHG | WEIGHT: 206 LBS | TEMPERATURE: 97.3 F

## 2023-09-12 DIAGNOSIS — Z09 HOSPITAL DISCHARGE FOLLOW-UP: Primary | ICD-10-CM

## 2023-09-12 DIAGNOSIS — G47.00 INSOMNIA, UNSPECIFIED TYPE: ICD-10-CM

## 2023-09-12 PROCEDURE — 1123F ACP DISCUSS/DSCN MKR DOCD: CPT | Performed by: FAMILY MEDICINE

## 2023-09-12 PROCEDURE — 1111F DSCHRG MED/CURRENT MED MERGE: CPT | Performed by: FAMILY MEDICINE

## 2023-09-12 PROCEDURE — 99214 OFFICE O/P EST MOD 30 MIN: CPT | Performed by: FAMILY MEDICINE

## 2023-09-12 RX ORDER — CARVEDILOL 3.12 MG/1
1 TABLET ORAL 2 TIMES DAILY
COMMUNITY
Start: 2023-08-28

## 2023-09-12 RX ORDER — AMOXICILLIN AND CLAVULANATE POTASSIUM 500; 125 MG/1; MG/1
TABLET, FILM COATED ORAL
COMMUNITY
Start: 2023-09-06

## 2023-09-12 RX ORDER — LANOLIN ALCOHOL/MO/W.PET/CERES
3 CREAM (GRAM) TOPICAL DAILY
Qty: 90 TABLET | Refills: 3 | Status: SHIPPED | OUTPATIENT
Start: 2023-09-12

## 2023-09-12 RX ORDER — TORSEMIDE 20 MG/1
1 TABLET ORAL DAILY
COMMUNITY
Start: 2023-08-28

## 2023-09-12 NOTE — PROGRESS NOTES
Post-Discharge Transitional Care Management Progress Note      Edin Mayorga   YOB: 1930    Date of Office Visit:  9/12/2023    Care management risk score Rising risk (score 2-5) and Complex Care (Scores >=6): No Risk Score On File     Non face to face  following discharge, date last encounter closed (first attempt may have been earlier): *No documented post hospital discharge outreach found in the last 14 days *No documented post hospital discharge outreach found in the last 14 days    Call initiated 2 business days of discharge: *No response recorded in the last 14 days    ASSESSMENT/PLAN:   Hospital discharge follow-up  -     [place] DISCHARGE MEDS RECONCILED W/ CURRENT OUTPATIENT MED LIST    Medical Decision Making: high complexity  Return in 3 months (on 12/12/2023). Subjective:   HPI:  Follow up of Hospital problems/diagnosis(es): Pneumonia, acute on chronic systolic heart failure, Alzheimer's disease    Inpatient course: Discharge summary reviewed- see chart. Interval history/Current status: Improved: Patient has been doing well. He denies any shortness of breath, cough, fever or chills. He is joined today by his wife. She is making arrangements for home health aide care although due to insurance restrictions she will be paying out-of-pocket. Patient Active Problem List   Diagnosis    GI bleed    Hypothyroidism    Benign prostatic hyperplasia    Stage 3b chronic kidney disease (HCC)    Congestive heart failure, unspecified HF chronicity, unspecified heart failure type ([address])    Moderate late onset Alzheimer's dementia without behavioral disturbance, psychotic disturbance, mood disturbance, or anxiety ([address])    Chronic systolic (congestive) heart failure    Insomnia       Medications listed as ordered at the time of discharge from hospital     Medication List            Accurate as of September 12, 2023 11:18 AM. If you have any questions, ask your nurse or doctor.

## 2023-09-18 ENCOUNTER — CARE COORDINATION (OUTPATIENT)
Dept: CARE COORDINATION | Age: 88
End: 2023-09-18

## 2023-09-18 NOTE — CARE COORDINATION
Telephone call with Alex Rasheed. She stated that patient was in the hospital last week . She feels he is in need of HHA to assist with, bathing and supervision of medications. She has not been able to reach PCP about this.   Discussed plan to send an in basket message to Vaishali Johnson about this request. In basket message sent to Dr. Vaishali Barclay asking for a home health care referral.

## 2023-09-26 ENCOUNTER — CARE COORDINATION (OUTPATIENT)
Dept: CARE COORDINATION | Age: 88
End: 2023-09-26

## 2023-09-26 ENCOUNTER — TELEPHONE (OUTPATIENT)
Dept: FAMILY MEDICINE CLINIC | Age: 88
End: 2023-09-26

## 2023-09-26 DIAGNOSIS — G30.1 MODERATE LATE ONSET ALZHEIMER'S DEMENTIA WITHOUT BEHAVIORAL DISTURBANCE, PSYCHOTIC DISTURBANCE, MOOD DISTURBANCE, OR ANXIETY (HCC): Primary | ICD-10-CM

## 2023-09-26 DIAGNOSIS — F02.B0 MODERATE LATE ONSET ALZHEIMER'S DEMENTIA WITHOUT BEHAVIORAL DISTURBANCE, PSYCHOTIC DISTURBANCE, MOOD DISTURBANCE, OR ANXIETY (HCC): Primary | ICD-10-CM

## 2023-09-26 NOTE — CARE COORDINATION
Telephone call with Reddy Davila. She indicated that she has not heard from [facility] [address]. Discussed plan to reach out to Dr. Adolfo Powers regarding [address] [facility]. She stated that it is getting more difficult to manage patient at home. She has not reached out to [place] yet. Provided Kayla Miranda with contact information for [place]ucky Doctor, [place] She expressed plan to have aunt reach out to [place].

## 2023-09-26 NOTE — TELEPHONE ENCOUNTER
[address] worker  asking for an order for home health - pt needs help with personal care, and setting up/ understanding of medication .

## 2023-09-26 NOTE — CARE COORDINATION
Telephone call to Dr. Maria C Dent. Spoke with [facility]  and made request physician order for [facility]. Explained sent Dr. Raissa Soler an in basket message last week asking for [facility] referral but their was not an order in the chart yet.

## 2023-10-03 ENCOUNTER — CARE COORDINATION (OUTPATIENT)
Dept: CARE COORDINATION | Age: 88
End: 2023-10-03

## 2023-10-03 NOTE — CARE COORDINATION
Telephone call to [facility]. Left voicemail explaining reached out to Dr. Rosa Nascimento for an order for home health care last week and this week. Requested return phone call. Call back number was provided.

## 2023-10-03 NOTE — CARE COORDINATION
Telephone call to Dr. Chucky Fischer office. Requested new order for home health care be sent to [facility].

## 2023-10-03 NOTE — CARE COORDINATION
Telephone call to [facility]. They indicated that they have not received order from physician for home health care for patient.

## 2023-10-11 ENCOUNTER — CARE COORDINATION (OUTPATIENT)
Dept: CARE COORDINATION | Age: 88
End: 2023-10-11

## 2023-10-11 NOTE — CARE COORDINATION
Telephone call with Tawny/Shazia/Leslie [facility] . They did receive an order but it is for a HHA and they do not supply this. Patient would have to have a skilled need for home health care. Patient would need to be seen by PCP to discuss what skilled services are needed.

## 2023-10-11 NOTE — CARE COORDINATION
Telephone call to [facility]. Discussed that home health care order has been placed. She hasn't heard anything. Discussed plan to reach out to [facility]  She stated that parents got a letter that Binu Johnson would no longer be in his plan. Discussed that Nathaniel had reached an agreement. Johny Narayanan indicated that she did find a private individual to come to the house to help patient a few days a week.

## 2023-10-11 NOTE — CARE COORDINATION
Telephone call to [facility]. Explained phone call with [facility]. Home Health Care cannot supply just an HHA alone and needs to be with skilled service. Presently the order is just for a HHA. She is going to get an appointment with Dr. Jay Nair to discuss home health care  and discuss need for skilled care for patient.

## 2023-10-17 DIAGNOSIS — I50.9 CONGESTIVE HEART FAILURE, UNSPECIFIED HF CHRONICITY, UNSPECIFIED HEART FAILURE TYPE (HCC): ICD-10-CM

## 2023-10-17 RX ORDER — ISOSORBIDE MONONITRATE 30 MG/1
30 TABLET, EXTENDED RELEASE ORAL DAILY
Qty: 30 TABLET | Refills: 0 | Status: SHIPPED | OUTPATIENT
Start: 2023-10-17

## 2023-10-17 RX ORDER — ATORVASTATIN CALCIUM 40 MG/1
40 TABLET, FILM COATED ORAL NIGHTLY
Qty: 30 TABLET | Refills: 1 | Status: SHIPPED | OUTPATIENT
Start: 2023-10-17

## 2023-10-17 NOTE — TELEPHONE ENCOUNTER
Spouse requesting refills - pt is out . ..     atorvastatin (LIPITOR) 40 MG tablet    isosorbide mononitrate (IMDUR) 30 MG extended release tablet    LOV 9/12/23  FOV 12/13/23

## 2023-10-17 NOTE — TELEPHONE ENCOUNTER
Patient is requesting medication refill. Please approve or deny this request.    Rx requested:  Requested Prescriptions     Pending Prescriptions Disp Refills    isosorbide mononitrate (IMDUR) 30 MG extended release tablet 30 tablet 0     Sig: Take 1 tablet by mouth daily    atorvastatin (LIPITOR) 40 MG tablet 30 tablet 1     Sig: Take 1 tablet by mouth nightly at bedtime.          Last Office Visit:   9/12/2023      Next Visit Date:  Future Appointments   Date Time Provider [address] [place] [address]   12/13/2023 10:00 AM Kain Mccord MD [address]anco

## 2023-10-18 ENCOUNTER — TELEPHONE (OUTPATIENT)
Dept: FAMILY MEDICINE CLINIC | Age: 88
End: 2023-10-18

## 2023-10-18 ENCOUNTER — CARE COORDINATION (OUTPATIENT)
Dept: CARE COORDINATION | Age: 88
End: 2023-10-18

## 2023-10-18 DIAGNOSIS — F02.B0 MODERATE LATE ONSET ALZHEIMER'S DEMENTIA WITHOUT BEHAVIORAL DISTURBANCE, PSYCHOTIC DISTURBANCE, MOOD DISTURBANCE, OR ANXIETY (HCC): Primary | ICD-10-CM

## 2023-10-18 DIAGNOSIS — G30.1 MODERATE LATE ONSET ALZHEIMER'S DEMENTIA WITHOUT BEHAVIORAL DISTURBANCE, PSYCHOTIC DISTURBANCE, MOOD DISTURBANCE, OR ANXIETY (HCC): Primary | ICD-10-CM

## 2023-10-18 NOTE — CARE COORDINATION
Telephone call to [facility]. Left voicemail of reason for call with request for return phone call. Call back number was provided.

## 2023-10-18 NOTE — TELEPHONE ENCOUNTER
Pt daughter calling [facility] is requesting more information.  They need skilled nursing added to the referral.

## 2023-10-24 NOTE — TELEPHONE ENCOUNTER
Spoke with daughter, advised she spoke with Chrissy Morataya social work.  I will reach out to gisela

## 2023-11-01 ENCOUNTER — CARE COORDINATION (OUTPATIENT)
Dept: CARE COORDINATION | Age: 88
End: 2023-11-01

## 2023-11-01 NOTE — TELEPHONE ENCOUNTER
Bello Gutierrez calling states [facility] is in need of a new referral with skilled nursing specification.  Would like an internal referral for [facility] PHONE- [phone]

## 2023-11-01 NOTE — CARE COORDINATION
Telephone call to [facility]. She has found a private individual is coming to the home twice a week. She has not called the [facility] yet about services. Discussed [employer] [facility] and she feels would not be eligible for Medicaid. She stated that she spoke to Maddy at Dr. Justin Ngo and they were wanting to speak to me about [address]. Discussed plan to contact Dr. Jitendra Anders.

## 2023-11-01 NOTE — CARE COORDINATION
Telephone call to Dr. Mason Collins and spoke with [place], [place]. Discussed need for skilled nursing referral for home health care  be sent to [facility]. Provided contact for information for [facility].

## 2023-11-01 NOTE — CARE COORDINATION
Telephone call with Tootie Kern. Explained that spoke to Rives, [facility] and she going to speak with Dr. Kita Cruz about a skilled home health care order be sent to [address] [place] [address].

## 2023-11-06 ENCOUNTER — TELEPHONE (OUTPATIENT)
Dept: FAMILY MEDICINE CLINIC | Age: 88
End: 2023-11-06

## 2023-11-06 NOTE — TELEPHONE ENCOUNTER
HIEU for PCP   [facility], STACEY BACH OF [facility]  [phone]  received a homecare referral  and this will start on Tues Nov 7.

## 2023-11-08 ENCOUNTER — CARE COORDINATION (OUTPATIENT)
Dept: CARE COORDINATION | Age: 88
End: 2023-11-08

## 2023-11-08 NOTE — CARE COORDINATION
Telephone call with Neisha Jeffrey. She stated that a nurse called the house and spoke with her mother. Nurse is  suppose to come next Thursday.

## 2023-11-08 NOTE — CARE COORDINATION
Telephone call to [facility]. Left voicemail of reason for call with request for return phone call. Call back number was provided.

## 2023-11-10 ENCOUNTER — TELEPHONE (OUTPATIENT)
Dept: FAMILY MEDICINE CLINIC | Age: 88
End: 2023-11-10

## 2023-11-10 DIAGNOSIS — F02.B0 MODERATE LATE ONSET ALZHEIMER'S DEMENTIA WITHOUT BEHAVIORAL DISTURBANCE, PSYCHOTIC DISTURBANCE, MOOD DISTURBANCE, OR ANXIETY (HCC): Primary | ICD-10-CM

## 2023-11-10 DIAGNOSIS — G30.1 MODERATE LATE ONSET ALZHEIMER'S DEMENTIA WITHOUT BEHAVIORAL DISTURBANCE, PSYCHOTIC DISTURBANCE, MOOD DISTURBANCE, OR ANXIETY (HCC): Primary | ICD-10-CM

## 2023-11-10 NOTE — TELEPHONE ENCOUNTER
[facility] seen pt today and requesting PT evaluation . Lilly Mccartney stated pt is doing good.     Secure line and PCP can leave vm.

## 2023-11-14 ENCOUNTER — CARE COORDINATION (OUTPATIENT)
Dept: CARE COORDINATION | Age: 88
End: 2023-11-14

## 2023-11-14 NOTE — CARE COORDINATION
Telephone call to Sugey/Andrew cort[facility]. Left voicemail of purpose of call with request for return phone call.   Call back number was provided

## 2023-11-14 NOTE — CARE COORDINATION
Telephone call to Magdiel/Tawana Calzada/Mac. He asked that transfer information be faxed to him at [phone]. Angie Alcantara requested transfer information.

## 2023-11-14 NOTE — CARE COORDINATION
Telephone call to Therese/Samson Carpenter/Mac. Left voicemail of nature of call with request for return phone call. Call back number was provided.

## 2023-11-14 NOTE — CARE COORDINATION
Telephone call with Elyse Rosario. She indicated that she found out that home care will only be for 30 days. She stated that her mother is feeling that patient needs to go to a nursing home. Discussed that he is a . She has made a call to [address] [place] [address] but has not heard back. Provided K[facility]lynn [facility] . Discussed [facility] as a option too. Discussed that patient would need to apply for Medicaid for nursing home placement. Provided Kaitlynn Sanchez with  Website for [employer] to do an application. Patient would need a secure until. Presently they have a private individual coming into help. Discussed could help with search for secure memory care units in nursing homes.

## 2023-11-14 NOTE — CARE COORDINATION
Telephone call to [facility]. Left voicemail of nature of call with request for return phone call. Call back number was provided.

## 2023-11-15 ENCOUNTER — CARE COORDINATION (OUTPATIENT)
Dept: CARE COORDINATION | Age: 88
End: 2023-11-15

## 2023-11-15 NOTE — CARE COORDINATION
Telephone call to [place]Admissions. Left voicemail of nature of call with request for return phone call. Call back number was provided.

## 2023-11-15 NOTE — CARE COORDINATION
Telephone call to [facility]. Update on phone calls to nursing homes was provided. She expressed plan to do Medicaid Application on line tonight.

## 2023-11-15 NOTE — CARE COORDINATION
Returning [address] [facility]/Admission-Clinical Liaison. Left voicemail returning her phone call. Provided call back number.

## 2023-11-15 NOTE — CARE COORDINATION
Telephone call with Errol Shaw. She indicated that her mother is stating that patient is not eligible for Medicaid because his monthly income is too great. Discussed Medicaid with Claudean Millet and difference in community Medicaid vs nursing home Medicaid. Encouraged her to apply for nursing home Medicaid.

## 2023-11-15 NOTE — CARE COORDINATION
Telephone call with Victor[facility]/Admissions. She indicated that they are in a COVID outbreak right now and would not have ability to admit until the end of next week. She also stated that patient would need a pending number for Medicaid in order to be admitted or patient would need to come in private pay. She requested that transfer information be faxed to her at [phone]. Faxed [facility] transfer information.

## 2023-11-15 NOTE — CARE COORDINATION
Telephone call with Magdiel/Tawana Calzada/Mac. He is in process of having nursing review information faxed and will get back to this writer.

## 2023-11-15 NOTE — CARE COORDINATION
Telephone call to Therese/Samson Carpenter/Mac. Left voicemail of purpose of call with request for return phone call. Call back number was provided.

## 2023-11-15 NOTE — CARE COORDINATION
Telephone call to Sugey/[facility]. Left voicemail of purpose of call with request for return phone call. Call back number was provided.

## 2023-11-15 NOTE — CARE COORDINATION
Telephone call with [facility]/Admissions. She explained that they have a COVID outbreak on secure unit and would not have an available bed until 11/20/2023. She requested that transfer information be faxed [phone]. Cipriano Schwab transfer paperwork.

## 2023-11-15 NOTE — CARE COORDINATION
Telephone call to Evelyn Samson/Mac. Left voicemail of reason for call with request for return phone call. Call back number was provided.

## 2023-11-15 NOTE — CARE COORDINATION
Telephone call with Therese/Samson Carpenter/Mac. She stated they do not have any beds on their secure memory unit and have a waiting list.  She expressed plan to call back with fax number so transfer information can be faxed.

## 2023-11-16 ENCOUNTER — CARE COORDINATION (OUTPATIENT)
Dept: CARE COORDINATION | Age: 88
End: 2023-11-16

## 2023-11-16 NOTE — CARE COORDINATION
Telephone call with Yovana Dhaliwal. Explained that Davon and Deisy will be in contact with her. Richi Jerez explained that she submitted Medicaid application on line yesterday.

## 2023-11-16 NOTE — CARE COORDINATION
Telephone call with Magdiel/Tawana Calzada/Mac. Nick Sidhu stated that he would like to talk to family about patient. Provided him with contact information for Naida/daughter and he expressed plan to contact Andres Larios.

## 2023-11-16 NOTE — CARE COORDINATION
Telephone call with [facility] admissions. She indicated that have a few people on list for memory care unit but if they do not take bed their would be one for patient. She request transfer information be faxed to her  [phone]. Transfer information was faxed to [facility].

## 2023-11-16 NOTE — CARE COORDINATION
Telephone call with Inna Samson/Mac. She indicated that their secure memory unit is assisted living and not long term care. They do not have any openings at this time.

## 2023-11-16 NOTE — CARE COORDINATION
Telephone call with Magdiel/Tawana Calzada/[facility]. He indicated that he did speak with daughter and would be able to take patient. However, he has to make some room moves to accommodate patient and will know more tomorrow if they have an available bed. He expressed plan to get back to this writer tomorrow if they will have a bed for patient.

## 2023-11-16 NOTE — CARE COORDINATION
[facility]. She stated that they do not have a bed for patient at this time and referred to [facility] Yudith Ryan

## 2023-11-17 ENCOUNTER — CARE COORDINATION (OUTPATIENT)
Dept: CARE COORDINATION | Age: 88
End: 2023-11-17

## 2023-11-17 NOTE — CARE COORDINATION
Telephone call to [facility]/Admissions. Left voicemail of reason for call with request for return phone call. Call back number was provided.

## 2023-11-17 NOTE — CARE COORDINATION
Telephone call with Errol Shaw. She indicated that she did speak with Michael Cross. She stated that patient would need to be private pay to be admitted to [facility] and this would be a hardship for patient. Daughter is wondering if Kirk would cover in a nursing home. Discussed would need therapy notes. She did state that a nurse and therapist has come to the home. She is unsure where they were coming from but their services has stopped. Discussed plan to contact [facility] to see if patient was receiving services from them.

## 2023-11-17 NOTE — CARE COORDINATION
Telephone call to  Magdiel/Tawana Calzada/Mac. Discussed learning that patient had PT with home care. Meño Walker asked that  this information be faxed to them at [phone].

## 2023-11-17 NOTE — CARE COORDINATION
Telephone call with [facility]/Admissions. No bed available. She indicated that [facility] is one of their sister facilities. She expressed plan to [facility] and ask for return phone call.

## 2023-11-17 NOTE — CARE COORDINATION
Emailed Magdiel/[email or web address]  patient's home care medical records. Called Bijal Mathias and confirmed that he received email. He expressed plan to send into insurance today for prior authorization. He is hoping to get authorization back by next Monday or Tuesday. He expressed plan follow up with daughter.

## 2023-11-17 NOTE — CARE COORDINATION
Telephone call with Magdiel/Tawana Calzada/Mac. He indicated that they will be able to accept patient. He indicated that he has been in contact with daughter and will be bringing in patient next week.

## 2023-11-17 NOTE — CARE COORDINATION
Telephone call to [facility] Z[facility]/Mac/Magdiel. Left message requesting Erick Leslie to contact this writer about available bed for patient. Provided call back number.

## 2023-11-17 NOTE — CARE COORDINATION
Telephone call with Javi Flowers. Discussed that Magdiel/Tawana Calzada/Mac is sending in for prior authorization with insurance and will be following up with her. Provided Laurie Arenas with contact information for Vinicio Armstrong who be covering for this writer next week.

## 2023-11-17 NOTE — CARE COORDINATION
Telephone call to [facility]/Admissions. Left voicemail of purpose of call with request for return phone call. Call back number was provided.

## 2023-11-17 NOTE — CARE COORDINATION
Telephone call with Kenneth/ [address] [place]. She did confirm that patient was receiving services from [address] [place] [address]. She indicated that her manager is stating that a release of information signed by patient would  be needed for  faxing  information to  [facility]. Discussed that it transfer information that is typically requested. She is going to talk to the person who is her manager today and get back to this writer. Smith from [place] and they will be emailing me patient's medical records.

## 2023-11-21 ENCOUNTER — OFFICE VISIT (OUTPATIENT)
Dept: FAMILY MEDICINE CLINIC | Age: 88
End: 2023-11-21
Payer: MEDICARE

## 2023-11-21 VITALS
TEMPERATURE: 97.6 F | BODY MASS INDEX: 27.9 KG/M2 | SYSTOLIC BLOOD PRESSURE: 120 MMHG | WEIGHT: 206 LBS | HEIGHT: 72 IN | HEART RATE: 50 BPM | OXYGEN SATURATION: 96 % | DIASTOLIC BLOOD PRESSURE: 58 MMHG

## 2023-11-21 DIAGNOSIS — J06.9 VIRAL URI WITH COUGH: Primary | ICD-10-CM

## 2023-11-21 PROCEDURE — 99213 OFFICE O/P EST LOW 20 MIN: CPT

## 2023-11-21 PROCEDURE — 1123F ACP DISCUSS/DSCN MKR DOCD: CPT

## 2023-11-21 RX ORDER — HYDRALAZINE HYDROCHLORIDE 25 MG/1
1 TABLET, FILM COATED ORAL 2 TIMES DAILY
COMMUNITY

## 2023-11-21 RX ORDER — AZITHROMYCIN 250 MG/1
TABLET, FILM COATED ORAL
Qty: 1 PACKET | Refills: 0 | Status: SHIPPED | OUTPATIENT
Start: 2023-11-21

## 2023-11-21 ASSESSMENT — ENCOUNTER SYMPTOMS
COUGH: 1
DIARRHEA: 0
SHORTNESS OF BREATH: 1
ABDOMINAL PAIN: 0
SINUS PRESSURE: 0
RHINORRHEA: 0
WHEEZING: 0
SORE THROAT: 0
EYES NEGATIVE: 1

## 2023-11-21 NOTE — PATIENT INSTRUCTIONS
Continue with DayQuil as needed. If symptoms not improve in 2 to 3 days or if patient begins to experience worsening of shortness of breath return for reevaluation.

## 2023-11-27 ENCOUNTER — CARE COORDINATION (OUTPATIENT)
Dept: CARE COORDINATION | Age: 88
End: 2023-11-27

## 2023-11-27 NOTE — CARE COORDINATION
Telephone call with France Kapoor. She stated that she is waiting to hear from Arsalan Bazan to hear if insurance will cover. She provided insurance to Arsalan Bazan She also spoke to [place] and they indicated that patient would need to be under hospice care for payment nursing home. She has an appointment with Dr. Tim Hernández on Wednesday to discuss hospice care in a nursing home.

## 2023-11-28 ENCOUNTER — TELEPHONE (OUTPATIENT)
Dept: FAMILY MEDICINE CLINIC | Age: 88
End: 2023-11-28

## 2023-11-28 ENCOUNTER — CARE COORDINATION (OUTPATIENT)
Dept: CARE COORDINATION | Age: 88
End: 2023-11-28

## 2023-11-28 NOTE — TELEPHONE ENCOUNTER
[place] calling states she is attempting to get pt into a nursing home. Griffin Faulkner states PCP needs to call insurance company for a peer to peer for this. States this needs to be completed by 1:45 PM today [phone] option 2     Need to discuss medical need for pt to go to [address] [place] [address].

## 2023-11-28 NOTE — CARE COORDINATION
Received notification from Janee Chau MA that peer to peer was approved. Telephone call to Magdiel/Tawana Calzada/Mac. Left voicemail that peer to peer was done and approved. Provided call back number.

## 2023-11-28 NOTE — TELEPHONE ENCOUNTER
Called and spoke to spouse, the patient is almost completely noncoherent. He has no recollection of where he is. He is not able to use the bathroom without assistance or feed himself without assistance and requires constant monitoring . Also spoke to daughter with approval ,    Spoke to the representative and articulated the safety concerns and she informed me of the approval.

## 2023-11-28 NOTE — CARE COORDINATION
Telephone call to [facility]. She did confirm that her mother is going to take patient to [facility] tomorrow. Encouraged her to follow up with Medicaid. She has an upcoming phone interview with them. She also spoke of patient have an appointment with Nelly Vann on 12/11/2023. Discussed with Juanjose Arias to discuss Medicaid and VA issues with Edward Rahman at [facility].

## 2023-11-28 NOTE — CARE COORDINATION
Telephone call with Magdiel/Tawana Calzada/Mac. Relayed results of phone call with Luke and request peer to peer. Discussed contacted Adam Johnson Severe office and asking for peer to peer and they are to let me know if able to do. Pato Brandon expressed plan to update daughter.

## 2023-11-28 NOTE — CARE COORDINATION
Telephone call with Becka Ellington. Explained need for peer to peer with [address] [place] [address] by 1:45 PM today. Provided phone number to call for peer to peer to be made. Liang to get message to Dr. Slewyn Ellington.

## 2023-11-28 NOTE — CARE COORDINATION
Telephone call with Magdiel/Tawana Calzada/Admissions. He indicated received approval from insurance for patient to be admitted.   He stated that he talked to daughter and patient is going to be admitted tomorrow at 130 PM.

## 2023-11-28 NOTE — CARE COORDINATION
Telephone call with Concha. Zia Mendoza is offering peer to peer call for patient to get certified for nursing home placement at [facility]. She asked that PCP call the following number by 1:45 PM today--[phone] Option 2. Discussed plan to reach out PCP to request peer to peer .

## 2023-11-30 ENCOUNTER — OFFICE VISIT (OUTPATIENT)
Dept: GERIATRIC MEDICINE | Age: 88
End: 2023-11-30

## 2023-11-30 DIAGNOSIS — E03.9 HYPOTHYROIDISM, UNSPECIFIED TYPE: ICD-10-CM

## 2023-11-30 DIAGNOSIS — I50.22 CHRONIC SYSTOLIC CHF (CONGESTIVE HEART FAILURE) (HCC): ICD-10-CM

## 2023-11-30 DIAGNOSIS — E78.5 HYPERLIPIDEMIA, UNSPECIFIED HYPERLIPIDEMIA TYPE: ICD-10-CM

## 2023-11-30 DIAGNOSIS — I10 HYPERTENSION, UNSPECIFIED TYPE: ICD-10-CM

## 2023-11-30 DIAGNOSIS — F03.90 DEMENTIA WITHOUT BEHAVIORAL DISTURBANCE (HCC): Primary | ICD-10-CM

## 2023-12-01 ENCOUNTER — OFFICE VISIT (OUTPATIENT)
Dept: GERIATRIC MEDICINE | Age: 88
End: 2023-12-01

## 2023-12-01 DIAGNOSIS — E78.5 HYPERLIPIDEMIA, UNSPECIFIED HYPERLIPIDEMIA TYPE: ICD-10-CM

## 2023-12-01 DIAGNOSIS — I50.22 CHRONIC SYSTOLIC CHF (CONGESTIVE HEART FAILURE) (HCC): ICD-10-CM

## 2023-12-01 DIAGNOSIS — F03.90 DEMENTIA WITHOUT BEHAVIORAL DISTURBANCE (HCC): Primary | ICD-10-CM

## 2023-12-01 DIAGNOSIS — I10 HYPERTENSION, UNSPECIFIED TYPE: ICD-10-CM

## 2023-12-01 DIAGNOSIS — E03.9 HYPOTHYROIDISM, UNSPECIFIED TYPE: ICD-10-CM

## 2023-12-05 ENCOUNTER — OFFICE VISIT (OUTPATIENT)
Dept: GERIATRIC MEDICINE | Age: 88
End: 2023-12-05

## 2023-12-05 ENCOUNTER — CARE COORDINATION (OUTPATIENT)
Dept: CARE COORDINATION | Age: 88
End: 2023-12-05

## 2023-12-05 DIAGNOSIS — E78.5 HYPERLIPIDEMIA, UNSPECIFIED HYPERLIPIDEMIA TYPE: ICD-10-CM

## 2023-12-05 DIAGNOSIS — I50.22 CHRONIC SYSTOLIC CHF (CONGESTIVE HEART FAILURE) (HCC): ICD-10-CM

## 2023-12-05 DIAGNOSIS — F03.90 DEMENTIA WITHOUT BEHAVIORAL DISTURBANCE (HCC): Primary | ICD-10-CM

## 2023-12-05 DIAGNOSIS — E03.9 HYPOTHYROIDISM, UNSPECIFIED TYPE: ICD-10-CM

## 2023-12-05 DIAGNOSIS — I10 HYPERTENSION, UNSPECIFIED TYPE: ICD-10-CM

## 2023-12-05 PROBLEM — G47.00 INSOMNIA: Status: ACTIVE | Noted: 2023-12-05

## 2023-12-05 PROBLEM — J34.89 MASS OF SINUS: Status: ACTIVE | Noted: 2023-12-05

## 2023-12-05 PROBLEM — N40.0 BPH (BENIGN PROSTATIC HYPERPLASIA): Status: ACTIVE | Noted: 2023-12-05

## 2023-12-05 RX ORDER — FERROUS SULFATE 325(65) MG
325 TABLET, DELAYED RELEASE (ENTERIC COATED) ORAL 2 TIMES DAILY
COMMUNITY

## 2023-12-05 RX ORDER — ATORVASTATIN CALCIUM 40 MG/1
40 TABLET, FILM COATED ORAL DAILY
COMMUNITY

## 2023-12-05 RX ORDER — LEVOTHYROXINE SODIUM 112 UG/1
112 TABLET ORAL
COMMUNITY

## 2023-12-05 RX ORDER — CARVEDILOL 3.12 MG/1
3.12 TABLET ORAL
COMMUNITY

## 2023-12-05 RX ORDER — MELATONIN 3 MG
1 CAPSULE ORAL NIGHTLY PRN
COMMUNITY

## 2023-12-05 RX ORDER — ISOSORBIDE MONONITRATE 30 MG/1
30 TABLET, EXTENDED RELEASE ORAL DAILY
COMMUNITY

## 2023-12-05 RX ORDER — ASPIRIN 81 MG/1
81 TABLET ORAL ONCE
COMMUNITY

## 2023-12-05 RX ORDER — TORSEMIDE 20 MG/1
20 TABLET ORAL DAILY
COMMUNITY

## 2023-12-05 RX ORDER — LATANOPROST 50 UG/ML
1 SOLUTION/ DROPS OPHTHALMIC DAILY
COMMUNITY
Start: 2022-03-20

## 2023-12-05 NOTE — CARE COORDINATION
Telephone call to [facility]. Left voicemail of reason for call with request for return phone call. Call back number was provided.

## 2023-12-06 ENCOUNTER — CARE COORDINATION (OUTPATIENT)
Dept: CARE COORDINATION | Age: 88
End: 2023-12-06

## 2023-12-06 NOTE — CARE COORDINATION
Received voicemail from [address] [place] [address] that patient is being discharged to home today from [facility]. Telephone call to [address] [place] [address]. Left voicemail asking for return phone call. Call back number was provided.

## 2023-12-07 ENCOUNTER — OFFICE VISIT (OUTPATIENT)
Dept: GERIATRIC MEDICINE | Age: 88
End: 2023-12-07

## 2023-12-07 DIAGNOSIS — I10 HYPERTENSION, UNSPECIFIED TYPE: ICD-10-CM

## 2023-12-07 DIAGNOSIS — I50.22 CHRONIC SYSTOLIC CHF (CONGESTIVE HEART FAILURE) (HCC): ICD-10-CM

## 2023-12-07 DIAGNOSIS — E03.9 HYPOTHYROIDISM, UNSPECIFIED TYPE: ICD-10-CM

## 2023-12-07 DIAGNOSIS — E78.5 HYPERLIPIDEMIA, UNSPECIFIED HYPERLIPIDEMIA TYPE: ICD-10-CM

## 2023-12-07 DIAGNOSIS — F03.90 DEMENTIA WITHOUT BEHAVIORAL DISTURBANCE (HCC): Primary | ICD-10-CM

## 2023-12-08 ENCOUNTER — OFFICE VISIT (OUTPATIENT)
Dept: GERIATRIC MEDICINE | Age: 88
End: 2023-12-08

## 2023-12-08 DIAGNOSIS — F03.90 DEMENTIA WITHOUT BEHAVIORAL DISTURBANCE (HCC): Primary | ICD-10-CM

## 2023-12-08 DIAGNOSIS — E03.9 HYPOTHYROIDISM, UNSPECIFIED TYPE: ICD-10-CM

## 2023-12-08 DIAGNOSIS — E78.5 HYPERLIPIDEMIA, UNSPECIFIED HYPERLIPIDEMIA TYPE: ICD-10-CM

## 2023-12-08 DIAGNOSIS — I50.22 CHRONIC SYSTOLIC CHF (CONGESTIVE HEART FAILURE) (HCC): ICD-10-CM

## 2023-12-08 DIAGNOSIS — I10 HYPERTENSION, UNSPECIFIED TYPE: ICD-10-CM

## 2023-12-11 ENCOUNTER — OFFICE VISIT (OUTPATIENT)
Dept: GERIATRIC MEDICINE | Age: 88
End: 2023-12-11

## 2023-12-11 DIAGNOSIS — I50.22 CHRONIC SYSTOLIC CHF (CONGESTIVE HEART FAILURE) (HCC): ICD-10-CM

## 2023-12-11 DIAGNOSIS — E03.9 HYPOTHYROIDISM, UNSPECIFIED TYPE: ICD-10-CM

## 2023-12-11 DIAGNOSIS — F03.90 DEMENTIA WITHOUT BEHAVIORAL DISTURBANCE (HCC): Primary | ICD-10-CM

## 2023-12-11 DIAGNOSIS — I10 HYPERTENSION, UNSPECIFIED TYPE: ICD-10-CM

## 2023-12-11 DIAGNOSIS — E78.5 HYPERLIPIDEMIA, UNSPECIFIED HYPERLIPIDEMIA TYPE: ICD-10-CM

## 2023-12-12 ENCOUNTER — CARE COORDINATION (OUTPATIENT)
Dept: CARE COORDINATION | Age: 88
End: 2023-12-12

## 2023-12-12 ENCOUNTER — OFFICE VISIT (OUTPATIENT)
Dept: GERIATRIC MEDICINE | Age: 88
End: 2023-12-12

## 2023-12-12 DIAGNOSIS — I50.22 CHRONIC SYSTOLIC CHF (CONGESTIVE HEART FAILURE) (HCC): ICD-10-CM

## 2023-12-12 DIAGNOSIS — I10 HYPERTENSION, UNSPECIFIED TYPE: ICD-10-CM

## 2023-12-12 DIAGNOSIS — E78.5 HYPERLIPIDEMIA, UNSPECIFIED HYPERLIPIDEMIA TYPE: ICD-10-CM

## 2023-12-12 DIAGNOSIS — E03.9 HYPOTHYROIDISM, UNSPECIFIED TYPE: ICD-10-CM

## 2023-12-12 DIAGNOSIS — F03.90 DEMENTIA WITHOUT BEHAVIORAL DISTURBANCE (HCC): Primary | ICD-10-CM

## 2023-12-12 NOTE — CARE COORDINATION
Telephone call to [facility]. Left voicemail of purpose of call with request for return phone call.   Call back number was provided,

## 2023-12-14 NOTE — PROGRESS NOTES
SNF PROGRESS NOTE      Cc- Dementia       Patient is a Darreld Plants 80 y.o. male s/p hospital stay for pneumonia, dementia and CHF systolic. He was admitted for the increased LE edema. His echo showed worsening dysfunction to EF 32%. He is pleasant and sitting in the chair. He doesn't eat much.           Past Medical History:   Diagnosis Date    CHF (congestive heart failure) (HCC)     Stage 3b chronic kidney disease ([address]) 3/23/2022     Baclofen    VS reviewed    Gen- Alert and oriented x 1-2   Heart- RRR no murmur 1+ b/l  LE edema   Lungs- CTA b/l no resp distress RA oxygen   Abd- bs x 4           Assessment and Plan    Dementia   Chronic Systolic CHF   Coreg, torsemide    CKD stage 3   HTN   isosorbide  Hypothyroid   Synthroid   HLD  Statin       April Faria DO, [facility]     Electronically signed by: Subhash Morales DO on 12/5/2023
[facility] AT HOME

## 2023-12-15 NOTE — PROGRESS NOTES
SNF PROGRESS NOTE      Cc- Dementia      Patient is a Kerrie Acostaant 80 y.o. male s/p hospital stay for pneumonia, dementia and CHF systolic. He was admitted for the increased LE edema. His echo showed worsening dysfunction to EF 32%. Patient just had an episode earlier of the patient urinating in another patient residents laundry  basket. He is occasionally difficult to direct.          Past Medical History:   Diagnosis Date    CHF (congestive heart failure) (HCC)     Stage 3b chronic kidney disease ([address]) 3/23/2022     Baclofen    VS reviewed    Gen- Alert and oriented x 1-2   Heart- RRR no murmur 1+ b/l  LE edema   Lungs- CTA b/l no resp distress RA oxygen   Abd- bs x 4           Assessment and Plan    Dementia   Chronic Systolic CHF   Coreg, torsemide    CKD stage 3   HTN   isosorbide  Hypothyroid   Synthroid   HLD  Statin       Sukhdeep Cullen DO, [facility]     Electronically signed by: Sandra Regalado DO on 12/7/2023

## 2023-12-16 NOTE — PROGRESS NOTES
SNF PROGRESS NOTE      Cc- Dementia      Patient is a Pk Hoover 80 y.o. male s/p hospital stay for pneumonia, dementia and CHF systolic. He was admitted for the increased LE edema. His echo showed worsening dysfunction to EF 32%. Patient walking in the hallway. His appetite is poor. Past Medical History:   Diagnosis Date    CHF (congestive heart failure) (HCC)     Stage 3b chronic kidney disease ([address]) 3/23/2022     Baclofen    VS reviewed      Gen- Alert and oriented x 1  Heart- RRR no murmur 1+ b/l  LE edema   Lungs- CTA b/l no resp distress RA oxygen   Abd- bs x 4        Assessment and Plan    Dementia with agitation.    Chronic Systolic CHF   Coreg, torsemide    CKD stage 3   HTN   isosorbide  Hypothyroid   Synthroid   HLD  Statin       Zak La DO, [facility]     Electronically signed by: Herminia Sanderson DO on 12/11/2023

## 2023-12-28 ENCOUNTER — APPOINTMENT (OUTPATIENT)
Dept: CARDIOLOGY | Facility: CLINIC | Age: 88
End: 2023-12-28
Payer: MEDICARE

## 2024-01-01 ENCOUNTER — OFFICE VISIT (OUTPATIENT)
Dept: GERIATRIC MEDICINE | Age: 89
End: 2024-01-01

## 2024-01-01 DIAGNOSIS — G30.1 MODERATE LATE ONSET ALZHEIMER'S DEMENTIA WITHOUT BEHAVIORAL DISTURBANCE, PSYCHOTIC DISTURBANCE, MOOD DISTURBANCE, OR ANXIETY (HCC): ICD-10-CM

## 2024-01-01 DIAGNOSIS — I50.22 CHRONIC SYSTOLIC CHF (CONGESTIVE HEART FAILURE) (HCC): ICD-10-CM

## 2024-01-01 DIAGNOSIS — E78.5 HYPERLIPIDEMIA, UNSPECIFIED HYPERLIPIDEMIA TYPE: ICD-10-CM

## 2024-01-01 DIAGNOSIS — N18.32 STAGE 3B CHRONIC KIDNEY DISEASE (HCC): ICD-10-CM

## 2024-01-01 DIAGNOSIS — F02.B0 MODERATE LATE ONSET ALZHEIMER'S DEMENTIA WITHOUT BEHAVIORAL DISTURBANCE, PSYCHOTIC DISTURBANCE, MOOD DISTURBANCE, OR ANXIETY (HCC): ICD-10-CM

## 2024-01-01 DIAGNOSIS — F03.90 DEMENTIA WITHOUT BEHAVIORAL DISTURBANCE (HCC): Primary | ICD-10-CM

## 2024-01-01 DIAGNOSIS — I10 HYPERTENSION, UNSPECIFIED TYPE: ICD-10-CM

## 2024-01-01 DIAGNOSIS — E03.9 HYPOTHYROIDISM, UNSPECIFIED TYPE: ICD-10-CM

## 2024-01-04 ENCOUNTER — CARE COORDINATION (OUTPATIENT)
Dept: CARE COORDINATION | Age: 89
End: 2024-01-04

## 2024-01-05 ENCOUNTER — CARE COORDINATION (OUTPATIENT)
Dept: CARE COORDINATION | Age: 89
End: 2024-01-05

## 2024-01-05 NOTE — CARE COORDINATION
Received voicemail from Naida/daughter.  She indicated that patient has been in out of hospital.  He is presently at hospital and working on getting him on hospice and return to [facility].Medicaid is still pending.

## 2024-01-05 NOTE — CARE COORDINATION
Telephone call with Naida/daughter.  Discussed since patient is going to be on hospice and return to nursing home will discharge from [facility].  Daughter was in agreement for plan

## 2024-01-07 ENCOUNTER — OFFICE VISIT (OUTPATIENT)
Dept: GERIATRIC MEDICINE | Age: 89
End: 2024-01-07
Payer: MEDICARE

## 2024-01-07 DIAGNOSIS — F03.90 DEMENTIA WITHOUT BEHAVIORAL DISTURBANCE (HCC): Primary | ICD-10-CM

## 2024-01-07 DIAGNOSIS — I50.22 CHRONIC SYSTOLIC CHF (CONGESTIVE HEART FAILURE) (HCC): ICD-10-CM

## 2024-01-07 DIAGNOSIS — I10 HYPERTENSION, UNSPECIFIED TYPE: ICD-10-CM

## 2024-01-07 DIAGNOSIS — E03.9 HYPOTHYROIDISM, UNSPECIFIED TYPE: ICD-10-CM

## 2024-01-07 DIAGNOSIS — E78.5 HYPERLIPIDEMIA, UNSPECIFIED HYPERLIPIDEMIA TYPE: ICD-10-CM

## 2024-01-07 PROCEDURE — 99306 1ST NF CARE HIGH MDM 50: CPT | Performed by: INTERNAL MEDICINE

## 2024-01-07 PROCEDURE — 1123F ACP DISCUSS/DSCN MKR DOCD: CPT | Performed by: INTERNAL MEDICINE

## 2024-01-08 NOTE — PROGRESS NOTES
History and Physical      CHIEF COMPLAINT:  dementia     History of Present Illness:      A 89 y.o. male who is being seen at who is being seen at [facility] s/ hospital stay for pneumonia, dementia and CHF systolic.  He was admitted for the increased LE edema. His echo showed worsening dysfunction to EF 32%. Then he was admitted for dementia.     The patient returned from the hospital. He is under hospice care. He is sitting in the chair. He is placed on comfort meds.     REVIEW OF SYSTEMS:  A complete 10 Point review of systems was preformed and negative unless previously stated      PMH:  Past Medical History:   Diagnosis Date    CHF (congestive heart failure) (HCC)     Stage 3b chronic kidney disease (HCC) 3/23/2022       Surgical History:  Past Surgical History:   Procedure Laterality Date    COLONOSCOPY N/A 5/1/2020    COLONOSCOPY ROOM 175 performed by Juan Marquez [facility]    HIP SURGERY      THYROIDECTOMY         Medications Prior to Admission:    Prior to Admission medications    Medication Sig Start Date End Date Taking? Authorizing Provider   isosorbide mononitrate (IMDUR) 30 MG extended release tablet Take 1 tablet by mouth daily  Patient taking differently: Take 1 tablet by mouth daily Indications: High Blood Pressure Disorder 10/17/23   Meng Joshi MD   atorvastatin (LIPITOR) 40 MG tablet Take 1 tablet by mouth nightly at bedtime.  Patient taking differently: Take 1 tablet by mouth nightly Indications: High Amount of Fats in the Blood at bedtime. 10/17/23   Meng Joshi MD   Handicap Placard MISC by Does not apply route Expires: 09/12/24 9/12/23   Meng Joshi MD   carvedilol (COREG) 3.125 MG tablet Take 1 tablet by mouth 2 times daily Indications: High Blood Pressure Disorder 8/28/23   ProviderAris MD   torsemide (DEMADEX) 20 MG tablet Take 1 tablet by mouth daily Indications: Congestive Heart Failure 8/28/23   Aris Fregoso MD   melatonin 3 MG TABS tablet Take 1

## 2024-01-16 NOTE — PROGRESS NOTES
SNF PROGRESS NOTE      Cc- dementia       Patient is a Bj Funk 89 y.o. male who is being seen at who is being seen at [facility] s/ hospital stay for pneumonia, dementia and CHF systolic.  He was admitted for the increased LE edema. His echo showed worsening dysfunction to EF 32%. Then he was admitted for dementia.     Over the last 30 days, patient displayed many instances of behavior. He was also sent to the hospital due to behaviors. Hospice was consulted.         Past Medical History:   Diagnosis Date    CHF (congestive heart failure) (HCC)     Stage 3b chronic kidney disease (HCC) 3/23/2022     Baclofen    VS reviewed    Gen- Alert and oriented x 1  Heart- RRR no murmur no LE edema   Lungs- CTA b/l no resp distress RA oxygen   Abd- bs x 4           Assessment and Plan    Dementia with behavioral disturbances  Chronic Systolic CHF   Coreg, torsemide   EF 32%    CKD stage 3   HTN   isosorbide  Hypothyroid   Synthroid   HLD  Statin         Signed on with hospice.       NELY Maxwell DO     Electronically signed by: Zuly Harris DO on 1/15/2024

## 2024-01-17 ENCOUNTER — OFFICE VISIT (OUTPATIENT)
Dept: GERIATRIC MEDICINE | Age: 89
End: 2024-01-17

## 2024-01-17 ENCOUNTER — CARE COORDINATION (OUTPATIENT)
Dept: CASE MANAGEMENT | Age: 89
End: 2024-01-17

## 2024-01-17 DIAGNOSIS — N18.32 STAGE 3B CHRONIC KIDNEY DISEASE (HCC): ICD-10-CM

## 2024-01-17 DIAGNOSIS — E03.9 HYPOTHYROIDISM, UNSPECIFIED TYPE: ICD-10-CM

## 2024-01-17 DIAGNOSIS — F03.90 DEMENTIA WITHOUT BEHAVIORAL DISTURBANCE (HCC): Primary | ICD-10-CM

## 2024-01-17 DIAGNOSIS — G30.1 MODERATE LATE ONSET ALZHEIMER'S DEMENTIA WITHOUT BEHAVIORAL DISTURBANCE, PSYCHOTIC DISTURBANCE, MOOD DISTURBANCE, OR ANXIETY (HCC): ICD-10-CM

## 2024-01-17 DIAGNOSIS — I50.22 CHRONIC SYSTOLIC CHF (CONGESTIVE HEART FAILURE) (HCC): ICD-10-CM

## 2024-01-17 DIAGNOSIS — I10 HYPERTENSION, UNSPECIFIED TYPE: ICD-10-CM

## 2024-01-17 DIAGNOSIS — E78.5 HYPERLIPIDEMIA, UNSPECIFIED HYPERLIPIDEMIA TYPE: ICD-10-CM

## 2024-01-17 DIAGNOSIS — F02.B0 MODERATE LATE ONSET ALZHEIMER'S DEMENTIA WITHOUT BEHAVIORAL DISTURBANCE, PSYCHOTIC DISTURBANCE, MOOD DISTURBANCE, OR ANXIETY (HCC): ICD-10-CM

## 2024-01-17 NOTE — CARE COORDINATION
Per [facility] pt  on 2024 at [facility]      Eri Dumont, BSN, RN   [facility] Transition Nurse  [phone]

## 2024-01-17 NOTE — PROGRESS NOTES
Discharge Summary       Discharge Disposition       Discharge Condition N/A       Discharge time 34 min       Diet/ Activity/ Meds- N/A         Brief SNF Course--     This is an 89 y.o. male who is being seen at [facility]. The patient had a history of CHF and and dementia.           Discharge Diagnosis :    Dementia with behavioral disturbances  Chronic Systolic CHF     CKD stage 3   HTN   Hypothyroid   HLD        Follow up --     N/A         NELY Maxwell DO     Electronically signed by: Zuly Harris DO on 2024

## 2024-01-19 ENCOUNTER — APPOINTMENT (OUTPATIENT)
Dept: CARDIOLOGY | Facility: CLINIC | Age: 89
End: 2024-01-19
Payer: MEDICARE

## 2024-01-19 NOTE — PROGRESS NOTES
Discharge Summary         Discharge Disposition        Discharge Condition N/A         Discharge time 34 min         Diet/ Activity/ Meds- N/A            Brief SNF Course--      This is an 89 y.o. male who is being seen at [facility]. The patient had a history of CHF and and dementia.               Discharge Diagnosis :     Dementia with behavioral disturbances  Chronic Systolic CHF     CKD stage 3   HTN   Hypothyroid   HLD           Follow up --      N/A

## 2024-04-17 NOTE — PROGRESS NOTES
SNF PROGRESS NOTE      Cc- Dementia      Patient is a Ulysses Mannan 80 y.o. male s/p hospital stay for pneumonia, dementia and CHF systolic. He was admitted for the increased LE edema. His echo showed worsening dysfunction to EF 32%. Patient continues with agitation occasionally. Eating fair. Past Medical History:   Diagnosis Date    CHF (congestive heart failure) (HCC)     Stage 3b chronic kidney disease ([address]) 3/23/2022     Baclofen    VS reviewed      Gen- Alert and oriented x 1-2   Heart- RRR no murmur 1+ b/l  LE edema   Lungs- CTA b/l no resp distress RA oxygen   Abd- bs x 4        Assessment and Plan    Dementia with agitation.    Chronic Systolic CHF   Coreg, torsemide    CKD stage 3   HTN   isosorbide  Hypothyroid   Synthroid   HLD  Statin       William Porter DO [facility]     Electronically signed by: Emil Parker DO on 12/8/2023
n/a

## (undated) DEVICE — 4-PORT MANIFOLD: Brand: NEPTUNE 2

## (undated) DEVICE — CONMED SCOPE SAVER BITE BLOCK, 20X27 MM: Brand: SCOPE SAVER

## (undated) DEVICE — Device: Brand: ENDO SMARTCAP

## (undated) DEVICE — BRUSH ENDO CLN L90.5IN SHTH DIA1.7MM BRIST DIA5-7MM 2-6MM

## (undated) DEVICE — ADAPTER FLSH PMP FLD MGMT GI IRRIG OFP 2 DISPOSABLE

## (undated) DEVICE — COVER DUST PERIMETER HUMPREY

## (undated) DEVICE — ENDO CARRY-ON PROCEDURE KIT: Brand: ENDO CARRY-ON PROCEDURE KIT

## (undated) DEVICE — GLOVE ORANGE PI 8   MSG9080

## (undated) DEVICE — TUBE SET 96 MM 64 MM H2O PERISTALTIC STD AUX CHANNEL